# Patient Record
Sex: MALE | Race: WHITE | NOT HISPANIC OR LATINO | Employment: UNEMPLOYED | URBAN - NONMETROPOLITAN AREA
[De-identification: names, ages, dates, MRNs, and addresses within clinical notes are randomized per-mention and may not be internally consistent; named-entity substitution may affect disease eponyms.]

---

## 2018-10-15 ENCOUNTER — OFFICE VISIT (OUTPATIENT)
Dept: FAMILY MEDICINE CLINIC | Facility: CLINIC | Age: 28
End: 2018-10-15

## 2018-10-15 VITALS
HEART RATE: 70 BPM | BODY MASS INDEX: 18.05 KG/M2 | DIASTOLIC BLOOD PRESSURE: 81 MMHG | SYSTOLIC BLOOD PRESSURE: 121 MMHG | OXYGEN SATURATION: 99 % | HEIGHT: 78 IN | WEIGHT: 156 LBS

## 2018-10-15 DIAGNOSIS — R51.9 CHRONIC NONINTRACTABLE HEADACHE, UNSPECIFIED HEADACHE TYPE: ICD-10-CM

## 2018-10-15 DIAGNOSIS — L02.32 BOIL OF BUTTOCK: Primary | ICD-10-CM

## 2018-10-15 DIAGNOSIS — G89.29 CHRONIC NONINTRACTABLE HEADACHE, UNSPECIFIED HEADACHE TYPE: ICD-10-CM

## 2018-10-15 LAB
25(OH)D3 SERPL-MCNC: 20 NG/ML
ALBUMIN SERPL-MCNC: 4.9 G/DL (ref 3.5–5)
ALBUMIN/GLOB SERPL: 1.9 G/DL (ref 1.5–2.5)
ALP SERPL-CCNC: 54 U/L (ref 40–129)
ALT SERPL W P-5'-P-CCNC: 19 U/L (ref 10–44)
ANION GAP SERPL CALCULATED.3IONS-SCNC: 5 MMOL/L (ref 3.6–11.2)
AST SERPL-CCNC: 18 U/L (ref 10–34)
BASOPHILS # BLD AUTO: 0.08 10*3/MM3 (ref 0–0.3)
BASOPHILS NFR BLD AUTO: 0.9 % (ref 0–2)
BILIRUB SERPL-MCNC: 0.4 MG/DL (ref 0.2–1.8)
BUN BLD-MCNC: 11 MG/DL (ref 7–21)
BUN/CREAT SERPL: 11.8 (ref 7–25)
CALCIUM SPEC-SCNC: 9.7 MG/DL (ref 7.7–10)
CHLORIDE SERPL-SCNC: 104 MMOL/L (ref 99–112)
CO2 SERPL-SCNC: 31 MMOL/L (ref 24.3–31.9)
CREAT BLD-MCNC: 0.93 MG/DL (ref 0.43–1.29)
DEPRECATED RDW RBC AUTO: 41.2 FL (ref 37–54)
EOSINOPHIL # BLD AUTO: 0.39 10*3/MM3 (ref 0–0.7)
EOSINOPHIL NFR BLD AUTO: 4.6 % (ref 0–5)
ERYTHROCYTE [DISTWIDTH] IN BLOOD BY AUTOMATED COUNT: 12.3 % (ref 11.5–14.5)
ERYTHROCYTE [SEDIMENTATION RATE] IN BLOOD: 4 MM/HR (ref 0–15)
GFR SERPL CREATININE-BSD FRML MDRD: 97 ML/MIN/1.73
GLOBULIN UR ELPH-MCNC: 2.6 GM/DL
GLUCOSE BLD-MCNC: 85 MG/DL (ref 70–110)
HCT VFR BLD AUTO: 45.1 % (ref 42–52)
HGB BLD-MCNC: 15.5 G/DL (ref 14–18)
IMM GRANULOCYTES # BLD: 0.02 10*3/MM3 (ref 0–0.03)
IMM GRANULOCYTES NFR BLD: 0.2 % (ref 0–0.5)
LYMPHOCYTES # BLD AUTO: 2.18 10*3/MM3 (ref 1–3)
LYMPHOCYTES NFR BLD AUTO: 25.6 % (ref 21–51)
MCH RBC QN AUTO: 32.2 PG (ref 27–33)
MCHC RBC AUTO-ENTMCNC: 34.4 G/DL (ref 33–37)
MCV RBC AUTO: 93.6 FL (ref 80–94)
MONOCYTES # BLD AUTO: 0.82 10*3/MM3 (ref 0.1–0.9)
MONOCYTES NFR BLD AUTO: 9.6 % (ref 0–10)
NEUTROPHILS # BLD AUTO: 5.01 10*3/MM3 (ref 1.4–6.5)
NEUTROPHILS NFR BLD AUTO: 59.1 % (ref 30–70)
OSMOLALITY SERPL CALC.SUM OF ELEC: 278.1 MOSM/KG (ref 273–305)
PLATELET # BLD AUTO: 212 10*3/MM3 (ref 130–400)
PMV BLD AUTO: 11.2 FL (ref 6–10)
POTASSIUM BLD-SCNC: 4 MMOL/L (ref 3.5–5.3)
PROT SERPL-MCNC: 7.5 G/DL (ref 6–8)
RBC # BLD AUTO: 4.82 10*6/MM3 (ref 4.7–6.1)
SODIUM BLD-SCNC: 140 MMOL/L (ref 135–153)
TSH SERPL DL<=0.05 MIU/L-ACNC: 1.05 MIU/ML (ref 0.55–4.78)
VIT B12 BLD-MCNC: 638 PG/ML (ref 211–911)
WBC NRBC COR # BLD: 8.5 10*3/MM3 (ref 4.5–12.5)

## 2018-10-15 PROCEDURE — 87205 SMEAR GRAM STAIN: CPT | Performed by: FAMILY MEDICINE

## 2018-10-15 PROCEDURE — 36415 COLL VENOUS BLD VENIPUNCTURE: CPT | Performed by: FAMILY MEDICINE

## 2018-10-15 PROCEDURE — 84443 ASSAY THYROID STIM HORMONE: CPT | Performed by: FAMILY MEDICINE

## 2018-10-15 PROCEDURE — 80053 COMPREHEN METABOLIC PANEL: CPT | Performed by: FAMILY MEDICINE

## 2018-10-15 PROCEDURE — 82607 VITAMIN B-12: CPT | Performed by: FAMILY MEDICINE

## 2018-10-15 PROCEDURE — 82306 VITAMIN D 25 HYDROXY: CPT | Performed by: FAMILY MEDICINE

## 2018-10-15 PROCEDURE — 99204 OFFICE O/P NEW MOD 45 MIN: CPT | Performed by: FAMILY MEDICINE

## 2018-10-15 PROCEDURE — 85025 COMPLETE CBC W/AUTO DIFF WBC: CPT | Performed by: FAMILY MEDICINE

## 2018-10-15 PROCEDURE — 85652 RBC SED RATE AUTOMATED: CPT | Performed by: FAMILY MEDICINE

## 2018-10-15 PROCEDURE — 87070 CULTURE OTHR SPECIMN AEROBIC: CPT | Performed by: FAMILY MEDICINE

## 2018-10-15 RX ORDER — DOXYCYCLINE HYCLATE 100 MG
100 TABLET ORAL DAILY
Qty: 7 TABLET | Refills: 0 | Status: SHIPPED | OUTPATIENT
Start: 2018-10-15 | End: 2018-11-26

## 2018-10-15 NOTE — PATIENT INSTRUCTIONS
Doxycycline for butt boil.  We will call you with lab results, surgery consult, and scheduling of the MRI.

## 2018-10-17 ENCOUNTER — TELEPHONE (OUTPATIENT)
Dept: FAMILY MEDICINE CLINIC | Facility: CLINIC | Age: 28
End: 2018-10-17

## 2018-10-17 LAB
BACTERIA SPEC AEROBE CULT: NORMAL
GRAM STN SPEC: NORMAL
GRAM STN SPEC: NORMAL

## 2018-10-17 RX ORDER — ERGOCALCIFEROL 1.25 MG/1
50000 CAPSULE ORAL WEEKLY
Qty: 8 CAPSULE | Refills: 0 | Status: SHIPPED | OUTPATIENT
Start: 2018-10-17 | End: 2020-05-20

## 2018-10-17 NOTE — TELEPHONE ENCOUNTER
It really depends on the MRI. I can't say either way for now. If it looks to be a lengthy workup, we would be more than happy to fill it out, but if the MRI is negative and this is simple migraines, then [facility] won't be approved for that. Thanks.     Patient notified & verbalized understanding.

## 2018-10-17 NOTE — TELEPHONE ENCOUNTER
It really depends on the MRI. I can't say either way for now. If it looks to be a lengthy workup, we would be more than happy to fill it out, but if the MRI is negative and this is simple migraines, then [employer] won't be approved for that. Thanks.

## 2018-10-17 NOTE — TELEPHONE ENCOUNTER
----- Message from Kalyani Villagran MD sent at 10/17/2018  7:45 AM EDT -----  Please call Asif. No final read on the culture yet, but his Vitamin D is low. Will he take some to boost it up? It may help with fatigue and joint pain. Please send Vitamin D 95154 IU weekly, #8, no refills, if he is agreeable. Thanks.    Spoke with patient & he verbalized understanding,agreeable to Vit. D,sent to pharmacy as ordered.

## 2018-10-17 NOTE — TELEPHONE ENCOUNTER
Wife called wanting to know if you would fill out FMLA papers for him due to a head injury & other medical problems until someone figures out what is going on with him?

## 2018-10-24 ENCOUNTER — OFFICE VISIT (OUTPATIENT)
Dept: SURGERY | Facility: CLINIC | Age: 28
End: 2018-10-24

## 2018-10-24 ENCOUNTER — TELEPHONE (OUTPATIENT)
Dept: FAMILY MEDICINE CLINIC | Facility: CLINIC | Age: 28
End: 2018-10-24

## 2018-10-24 VITALS
DIASTOLIC BLOOD PRESSURE: 71 MMHG | SYSTOLIC BLOOD PRESSURE: 133 MMHG | WEIGHT: 158 LBS | HEART RATE: 85 BPM | BODY MASS INDEX: 18.28 KG/M2 | HEIGHT: 78 IN

## 2018-10-24 DIAGNOSIS — L02.31 CUTANEOUS ABSCESS OF BUTTOCK: Primary | ICD-10-CM

## 2018-10-24 PROCEDURE — 99406 BEHAV CHNG SMOKING 3-10 MIN: CPT | Performed by: SURGERY

## 2018-10-24 PROCEDURE — 99242 OFF/OP CONSLTJ NEW/EST SF 20: CPT | Performed by: SURGERY

## 2018-10-24 NOTE — PROGRESS NOTES
"Asif Medina     VITALS: Blood pressure 121/81, pulse 70, height 198.1 cm (78\"), weight 70.8 kg (156 lb), SpO2 99 %.    Subjective  Chief Complaint:   Chief Complaint   Patient presents with   • Abscess   • Headache        History of Present Illness:  Patient is a 28 y.o.  male who presents to clinic secondary to establishment of care.    Patient was seen today for these conditions and concerns:  1) Patient has a boil on his right buttock that consistently over the last few years has flared up here and there. He states that when it first happened several years ago, he had to go to surgery to get it drained. Over the last several years, he has managed to control it with wound hygiene. He states that this most recent episode started about three weeks ago. He has tried to alleviate his symptoms with pressure, but he states that this has not worked. He states that it has been draining yellow pus for approximately a week and the pain has been worsening. Denies any fevers or chills. He states that the area has increased in warmth.  2) Patient also states that for about the last month, he has been battling a chronic headache. He states that the headache is diffuse. He has tried various OTC medications including Tylenol and Motrin without any alleviation. This has not happened to him before. This is not the worst headache of his life. Headache is described as all-encompassing, dull, and throbbing. It occasionally affects his vision - he feels that he is having tunnel vision at times. He denies any numbness or tingling of his hands. He states that nothing really makes it better, even if he goes to sleep.     No complaints about any of the medications.    The following portions of the patient's history were reviewed and updated as appropriate: allergies, current medications, past family history, past medical history, past social history, past surgical history and problem list.    Past Medical History  History reviewed. No " pertinent past medical history.    Review of Systems  Constitutional: Denies any recent history of fevers, chills, itching.  Eyes: Denies any changes in vision. Denies any blurry vision or diplopia.  Ears, Nose, Mouth, Throat: Denies any sore throat, rhinorrhea, or cough.  Cardiovascular: Denies any chest pain, pressure, or palpitations.  Respiratory: Denies any shortness of breath or wheezing.  Gastrointestinal: Denies any abdominal pain, nausea, vomiting, diarrhea, or constipation.  Genitourinary: Denies any changes in urination.  Musculoskeletal: Denies any muscle weakness.  Skin and/or breasts: Denies any rashes.  Neurological: Denies any changes in balance or gait.  Psychiatric: Denies any anxiety, depression, or insomnia. Denies any suicidal or homicidal ideations.  Endocrine: Denies any heat or cold intolerance. Denies any voice changes, polydipsia, or polyuria.  Hematologic/Lymphatic: Denies any anemia or easy bruising.    Surgical History  Past Surgical History:   Procedure Laterality Date   • INCISION AND DRAINAGE OF WOUND         Family History  Family History   Problem Relation Age of Onset   • Hypertension Mother    • Kidney nephrosis Mother    • Cancer Father    • Anxiety disorder Father    • Bipolar disorder Father    • Depression Father    • Alcohol abuse Father    • Alcohol abuse Paternal Uncle    • Heart disease Maternal Grandmother    • Diabetes Maternal Grandmother    • Hypertension Maternal Grandmother    • Heart disease Maternal Grandfather    • Diabetes Maternal Grandfather    • Hypertension Maternal Grandfather    • Heart disease Paternal Grandmother    • Diabetes Paternal Grandmother    • Hypertension Paternal Grandmother    • Heart disease Paternal Grandfather    • Diabetes Paternal Grandfather    • Hypertension Paternal Grandfather    • Cancer Paternal Grandfather    • Bipolar disorder Paternal Grandfather        Social History  Social History     Social History   • Marital status:   "    Spouse name: N/A   • Number of children: N/A   • Years of education: N/A     Occupational History   • Not on file.     Social History Main Topics   • Smoking status: Current Every Day Smoker   • Smokeless tobacco: Never Used   • Alcohol use No   • Drug use: No   • Sexual activity: Defer     Other Topics Concern   • Not on file     Social History Narrative   • No narrative on file       Objective  Physical Exam  Gen: Patient in NAD. Pleasant and answers appropriately. A&Ox3.    Skin: Warm and dry with normal turgor. No purpura, rashes, or unusual pigmentation noted. Hair is normal in appearance and distribution.    HEENT: NC/AT. No lesions noted. Conjunctiva clear, sclera nonicteric. PERRL. EOMI without nystagmus or strabismus. Fundi appear benign. No hemorrhages or exudates of eyes. Auditory canals are patent bilaterally without lesions. TMs intact,  nonerythematous, nonbulging without lesions. Nasal mucosa pink, nonerythematous, and nonedematous. Frontal and maxillary sinuses are nontender. O/P nonerythematous and moist without exudate.    Neck: Supple without lymph nodes palpated. FROM.     Lungs: CTA B/L without rales, rhonchi, crackles, or wheezes.    Heart: RRR. S1 and S2 normal. No S3 or S4. No MRGT.    Abd: Soft, nontender,nondistended. (+)BSx4 quadrants.     Extrem: No CCE. Radial pulses 2+/4 and equal B/L. FROMx4. No bone, joint, or muscle tenderness noted.    Buttock: 1/2\" open lesion noted over the right buttock that is draining yellow pus, slightly erythematous, nonedematous without any increased warmth.    Neuro: No focal motor/sensory deficits.    Procedures    Assessment/Plan  Asif Medina is a 28 y.o. here for establishment of care.  Diagnoses and all orders for this visit:    Boil of buttock  -     Culture, Routine - Swab, Buttock, Right  -     Ambulatory Referral to General Surgery  -     doxycycline (VIBRAMYICN) 100 MG tablet; Take 1 tablet by mouth Daily.  Secondary to history, will send to " surgery. Culture taken. Will start patient on doxycycline 100 mg orally daily x 7 days.     Chronic nonintractable headache, unspecified headache type  -     CBC & Differential  -     Comprehensive Metabolic Panel  -     Vitamin B12  -     Vitamin D 25 Hydroxy  -     TSH  -     Sedimentation Rate  -     CBC Auto Differential  -     MRI Brain Without Contrast  -     Osmolality, Calculated; Future  -     Osmolality, Calculated  Most likely migraines. Will order labs to exclude other diagnoses. MRI Brain secondary to history, consistency, and repeat occurrences.     I spent 45 minutes face-to-face with the patient and family, of which 45 minutes were spent counseling regarding evaluation, diagnosis, prognosis, diagnostic testing, referrals, treatment options, implications of compliance, coordination of care, long-term management fo concurrent comorbidities, and risk and benefits of different interventions and alternative therapies.    Patient's Body mass index is 18.03 kg/m². BMI is within normal parameters. No follow-up required.     I advised Asif of the risks of continuing to use tobacco, and I provided him with tobacco cessation educational materials in the After Visit Summary.     During this visit, I spent 3 minutes counseling the patient regarding tobacco cessation.     Findings and plans discussed with patient who verbalizes understanding and agreement. Will followup with patient once results are in. Patient to followup at clinic PRN or in one month for further medical followup.    Kalyani Villagran MD

## 2018-10-24 NOTE — TELEPHONE ENCOUNTER
----- Message from Kalyani Villagran MD sent at 10/21/2018  6:28 PM EDT -----  Let him know that the culture grew out skin pieter. Finish the doxycycline and follow up with surgery when we get him an appointment. Thanks.      Left a message to return call.      Patient returned call & verbalized understanding.

## 2018-10-26 ENCOUNTER — TELEPHONE (OUTPATIENT)
Dept: FAMILY MEDICINE CLINIC | Facility: CLINIC | Age: 28
End: 2018-10-26

## 2018-10-26 PROBLEM — L02.31 CUTANEOUS ABSCESS OF BUTTOCK: Status: ACTIVE | Noted: 2018-10-26

## 2018-10-26 NOTE — TELEPHONE ENCOUNTER
Wife called reports patient's employer has requested he have FMLA papers filled out because he has had to miss work because his Headaches have been so bad that he could not drive to work & if he loses his job he would lose his insurance,she wants to know if you are willing to fill these out.    Received fax,papers are on your desk.

## 2018-10-26 NOTE — PROGRESS NOTES
Subjective   Asif Medina is a 28 y.o. male is being seen for consultation today at the request of Kalyani Villagran MD    28 y.o. male presenting for evaluation of skin lesion. Lesion on Right buttock with patient's observations stated as pain, Chronic draining wound that intermittently becomes inflamed, exam of this area shows skin abscess, features Area of chronic abscess with no active infection or wound at this time.  Small scab in the area is noted, size 4 mm.    History reviewed. No pertinent past medical history.    Family History   Problem Relation Age of Onset   • Hypertension Mother    • Kidney nephrosis Mother    • Cancer Father    • Anxiety disorder Father    • Bipolar disorder Father    • Depression Father    • Alcohol abuse Father    • Alcohol abuse Paternal Uncle    • Heart disease Maternal Grandmother    • Diabetes Maternal Grandmother    • Hypertension Maternal Grandmother    • Heart disease Maternal Grandfather    • Diabetes Maternal Grandfather    • Hypertension Maternal Grandfather    • Heart disease Paternal Grandmother    • Diabetes Paternal Grandmother    • Hypertension Paternal Grandmother    • Heart disease Paternal Grandfather    • Diabetes Paternal Grandfather    • Hypertension Paternal Grandfather    • Cancer Paternal Grandfather    • Bipolar disorder Paternal Grandfather        Social History     Social History   • Marital status:      Spouse name: N/A   • Number of children: N/A   • Years of education: N/A     Occupational History   • Not on file.     Social History Main Topics   • Smoking status: Current Every Day Smoker   • Smokeless tobacco: Never Used   • Alcohol use No   • Drug use: No   • Sexual activity: Defer     Other Topics Concern   • Not on file     Social History Narrative   • No narrative on file       Past Surgical History:   Procedure Laterality Date   • INCISION AND DRAINAGE OF WOUND         Review of Systems   Constitutional: Negative for activity change,  "appetite change, chills and fever.   HENT: Negative for sore throat and trouble swallowing.    Eyes: Negative for visual disturbance.   Respiratory: Negative for cough and shortness of breath.    Cardiovascular: Negative for chest pain and palpitations.   Gastrointestinal: Negative for abdominal distention, abdominal pain, blood in stool, constipation, diarrhea, nausea and vomiting.   Endocrine: Negative for cold intolerance and heat intolerance.   Genitourinary: Negative for dysuria.   Musculoskeletal: Negative for joint swelling.   Skin: Positive for wound. Negative for color change and rash.   Allergic/Immunologic: Negative for immunocompromised state.   Neurological: Negative for dizziness, seizures, weakness and headaches.   Hematological: Negative for adenopathy. Does not bruise/bleed easily.   Psychiatric/Behavioral: Negative for agitation and confusion.         /71   Pulse 85   Ht 198.1 cm (78\")   Wt 71.7 kg (158 lb)   BMI 18.26 kg/m²   Objective   Physical Exam   Constitutional: He is oriented to person, place, and time. He appears well-developed.   HENT:   Head: Normocephalic and atraumatic.   Mouth/Throat: Mucous membranes are normal.   Eyes: Pupils are equal, round, and reactive to light. Conjunctivae are normal.   Neck: Neck supple. No JVD present. No tracheal deviation present. No thyromegaly present.   Cardiovascular: Normal rate and regular rhythm.  Exam reveals no gallop and no friction rub.    No murmur heard.  Pulmonary/Chest: Effort normal and breath sounds normal.   Abdominal: Soft. He exhibits no distension. There is no splenomegaly or hepatomegaly. There is no tenderness. No hernia.   Musculoskeletal: Normal range of motion. He exhibits no deformity.   Neurological: He is alert and oriented to person, place, and time.   Skin: Skin is warm and dry.   Right buttock with a small scab at the site of previous abscesses.  No active infection or induration at this time   Psychiatric: He has " a normal mood and affect.         Problems Addressed this Visit        Musculoskeletal and Integument    Cutaneous abscess of buttock - Primary          Assessment     Asif Medina is a 28 y.o. male with chronic abscess of the right buttock.  Due to the location I do not suspect fistula but if it becomes actively inflamed begin he'll return to care for evaluation  Patient's Body mass index is 18.26 kg/m². BMI is within normal parameters. No follow-up required.    I advised Asif of the risks of continuing to use tobacco, and I provided him with tobacco cessation educational materials in the After Visit Summary.     During this visit, I spent 3 minutes counseling the patient regarding tobacco cessation.

## 2018-11-02 ENCOUNTER — HOSPITAL ENCOUNTER (OUTPATIENT)
Dept: MRI IMAGING | Facility: HOSPITAL | Age: 28
Discharge: HOME | End: 2018-11-02
Admitting: FAMILY MEDICINE

## 2018-11-02 PROCEDURE — 70551 MRI BRAIN STEM W/O DYE: CPT | Performed by: RADIOLOGY

## 2018-11-02 PROCEDURE — 70551 MRI BRAIN STEM W/O DYE: CPT

## 2018-11-08 ENCOUNTER — OFFICE VISIT (OUTPATIENT)
Dept: FAMILY MEDICINE CLINIC | Facility: CLINIC | Age: 28
End: 2018-11-08

## 2018-11-08 VITALS
BODY MASS INDEX: 18.4 KG/M2 | OXYGEN SATURATION: 99 % | DIASTOLIC BLOOD PRESSURE: 78 MMHG | HEIGHT: 78 IN | WEIGHT: 159 LBS | SYSTOLIC BLOOD PRESSURE: 119 MMHG | TEMPERATURE: 98.2 F | HEART RATE: 87 BPM

## 2018-11-08 DIAGNOSIS — G43.709 CHRONIC MIGRAINE WITHOUT AURA WITHOUT STATUS MIGRAINOSUS, NOT INTRACTABLE: Primary | ICD-10-CM

## 2018-11-08 PROCEDURE — 99213 OFFICE O/P EST LOW 20 MIN: CPT | Performed by: FAMILY MEDICINE

## 2018-11-08 RX ORDER — SUMATRIPTAN 20 MG/1
SPRAY NASAL
Qty: 1 EACH | Refills: 1 | Status: SHIPPED | OUTPATIENT
Start: 2018-11-08 | End: 2018-12-07 | Stop reason: SDUPTHER

## 2018-11-27 NOTE — PROGRESS NOTES
"Asif Medina     VITALS: Blood pressure 119/78, pulse 87, temperature 98.2 °F (36.8 °C), temperature source Oral, height 198.1 cm (77.99\"), weight 72.1 kg (159 lb), SpO2 99 %.    Subjective  Chief Complaint:   Chief Complaint   Patient presents with   • Follow-up   • Headache     MRI        History of Present Illness:  Patient is a 28 y.o.  male who presents to clinic secondary to medical followup.     Patient was seen today for these conditions and concerns:  1) Boil on his right buttock has improved.     2) Patient also states that for about the last month, he has been battling a chronic headache. He states that the headache is diffuse. He has tried various OTC medications including Tylenol and Motrin without any alleviation. This has not happened to him before. This is not the worst headache of his life. Headache is described as all-encompassing, dull, and throbbing. It occasionally affects his vision - he feels that he is having tunnel vision at times. He denies any numbness or tingling of his hands. He states that nothing really makes it better, even if he goes to sleep. Workup has been benign.    No complaints about any of the medications.    The following portions of the patient's history were reviewed and updated as appropriate: allergies, current medications, past family history, past medical history, past social history, past surgical history and problem list.    Past Medical History  History reviewed. No pertinent past medical history.    Review of Systems  Constitutional: Denies any recent history of dizziness, fevers, chills, itching.  Eyes: Denies any changes in vision. Denies any blurry vision or diplopia.  Ears, Nose, Mouth, Throat: Denies any sore throat, rhinorrhea, or cough.  Cardiovascular: Denies any chest pain, pressure, or palpitations.  Respiratory: Denies any shortness of breath or wheezing.  Gastrointestinal: Denies any abdominal pain, nausea, vomiting, diarrhea, or " constipation.  Genitourinary: Denies any changes in urination.  Musculoskeletal: Denies any muscle weakness.  Skin and/or breasts: Denies any rashes.  Neurological: Denies any changes in balance or gait.  Psychiatric: Denies any anxiety, depression, or insomnia. Denies any suicidal or homicidal ideations.  Endocrine: Denies any heat or cold intolerance. Denies any voice changes, polydipsia, or polyuria.  Hematologic/Lymphatic: Denies any anemia or easy bruising.    Surgical History  Past Surgical History:   Procedure Laterality Date   • INCISION AND DRAINAGE OF WOUND     • WISDOM TOOTH EXTRACTION  10/30/2018       Family History  Family History   Problem Relation Age of Onset   • Hypertension Mother    • Kidney nephrosis Mother    • Cancer Father    • Anxiety disorder Father    • Bipolar disorder Father    • Depression Father    • Alcohol abuse Father    • Alcohol abuse Paternal Uncle    • Heart disease Maternal Grandmother    • Diabetes Maternal Grandmother    • Hypertension Maternal Grandmother    • Heart disease Maternal Grandfather    • Diabetes Maternal Grandfather    • Hypertension Maternal Grandfather    • Heart disease Paternal Grandmother    • Diabetes Paternal Grandmother    • Hypertension Paternal Grandmother    • Heart disease Paternal Grandfather    • Diabetes Paternal Grandfather    • Hypertension Paternal Grandfather    • Cancer Paternal Grandfather    • Bipolar disorder Paternal Grandfather        Social History  Social History     Socioeconomic History   • Marital status:      Spouse name: Not on file   • Number of children: Not on file   • Years of education: Not on file   • Highest education level: Not on file   Social Needs   • Financial resource strain: Not on file   • Food insecurity - worry: Not on file   • Food insecurity - inability: Not on file   • Transportation needs - medical: Not on file   • Transportation needs - non-medical: Not on file   Occupational History   • Not on file    Tobacco Use   • Smoking status: Current Every Day Smoker   • Smokeless tobacco: Never Used   Substance and Sexual Activity   • Alcohol use: No   • Drug use: No   • Sexual activity: Defer   Other Topics Concern   • Not on file   Social History Narrative   • Not on file       Objective  Physical Exam  Gen: Patient in NAD. Pleasant and answers appropriately. A&Ox3.    Skin: Warm and dry with normal turgor. No purpura, rashes, or unusual pigmentation noted. Hair is normal in appearance and distribution.    HEENT: NC/AT. No lesions noted. Conjunctiva clear, sclera nonicteric. PERRL. EOMI without nystagmus or strabismus. Fundi appear benign. No hemorrhages or exudates of eyes. Auditory canals are patent bilaterally without lesions. TMs intact,  nonerythematous, nonbulging without lesions. Nasal mucosa pink, nonerythematous, and nonedematous. Frontal and maxillary sinuses are nontender. O/P nonerythematous and moist without exudate.    Neck: Supple without lymph nodes palpated. FROM.     Lungs: CTA B/L without rales, rhonchi, crackles, or wheezes.    Heart: RRR. S1 and S2 normal. No S3 or S4. No MRGT.    Abd: Soft, nontender,nondistended. (+)BSx4 quadrants.     Extrem: No CCE. Radial pulses 2+/4 and equal B/L. FROMx4. No bone, joint, or muscle tenderness noted.    Neuro: No focal motor/sensory deficits.    Procedures    Assessment/Plan  Asif Medina is a 28 y.o. here for medical followup.  Diagnoses and all orders for this visit:    Chronic migraine without aura without status migrainosus, not intractable  -     SUMAtriptan (IMITREX) 20 MG/ACT nasal spray; 1 spray into the nostril(s) opposite of headache side Every 2 (Two) Hours As Needed for Migraine. Limit of two sprays a day.      Patient's Body mass index is 18.38 kg/m². BMI is below normal parameters. Recommendations include: treating the underlying disease process.       I advised Asif of the risks of continuing to use tobacco, and I provided him with tobacco  cessation educational materials in the After Visit Summary.     During this visit, I spent 3 minutes counseling the patient regarding tobacco cessation.     Findings and plans discussed with patient who verbalizes understanding and agreement. Will followup with patient once results are in. Patient to followup at clinic PRN or in one month for further medical followup.    Kalyani Villagran MD

## 2018-12-07 ENCOUNTER — TELEPHONE (OUTPATIENT)
Dept: FAMILY MEDICINE CLINIC | Facility: CLINIC | Age: 28
End: 2018-12-07

## 2018-12-07 ENCOUNTER — OFFICE VISIT (OUTPATIENT)
Dept: FAMILY MEDICINE CLINIC | Facility: CLINIC | Age: 28
End: 2018-12-07

## 2018-12-07 VITALS
DIASTOLIC BLOOD PRESSURE: 86 MMHG | WEIGHT: 169 LBS | BODY MASS INDEX: 19.55 KG/M2 | TEMPERATURE: 98.7 F | SYSTOLIC BLOOD PRESSURE: 132 MMHG | HEIGHT: 78 IN | HEART RATE: 70 BPM | OXYGEN SATURATION: 99 %

## 2018-12-07 DIAGNOSIS — G43.709 CHRONIC MIGRAINE WITHOUT AURA WITHOUT STATUS MIGRAINOSUS, NOT INTRACTABLE: Primary | ICD-10-CM

## 2018-12-07 DIAGNOSIS — E55.9 VITAMIN D DEFICIENCY: ICD-10-CM

## 2018-12-07 DIAGNOSIS — Z23 IMMUNIZATION DUE: ICD-10-CM

## 2018-12-07 PROCEDURE — 90471 IMMUNIZATION ADMIN: CPT | Performed by: FAMILY MEDICINE

## 2018-12-07 PROCEDURE — 99214 OFFICE O/P EST MOD 30 MIN: CPT | Performed by: FAMILY MEDICINE

## 2018-12-07 PROCEDURE — 90632 HEPA VACCINE ADULT IM: CPT | Performed by: FAMILY MEDICINE

## 2018-12-07 PROCEDURE — 99406 BEHAV CHNG SMOKING 3-10 MIN: CPT | Performed by: FAMILY MEDICINE

## 2018-12-07 RX ORDER — TOPIRAMATE 100 MG/1
50 TABLET, FILM COATED ORAL DAILY
Qty: 15 TABLET | Refills: 5 | Status: SHIPPED | OUTPATIENT
Start: 2018-12-07 | End: 2020-02-28

## 2018-12-07 RX ORDER — SUMATRIPTAN 20 MG/1
SPRAY NASAL
Qty: 1 EACH | Refills: 5 | Status: SHIPPED | OUTPATIENT
Start: 2018-12-07 | End: 2020-02-28

## 2018-12-07 RX ORDER — TOPIRAMATE 50 MG/1
50 TABLET, FILM COATED ORAL DAILY
Qty: 30 TABLET | Refills: 5 | Status: SHIPPED | OUTPATIENT
Start: 2018-12-07 | End: 2018-12-07 | Stop reason: SDUPTHER

## 2018-12-07 NOTE — TELEPHONE ENCOUNTER
Topamax 50mg & 25mg are on back order,ok with Provider to give 100mg tabs to take 1/2 tab daily & patient has no problems with this.

## 2018-12-24 NOTE — PROGRESS NOTES
"Asif Medina     VITALS: Blood pressure 132/86, pulse 70, temperature 98.7 °F (37.1 °C), temperature source Oral, height 198.1 cm (77.99\"), weight 76.7 kg (169 lb), SpO2 99 %.    Subjective  Chief Complaint:   Chief Complaint   Patient presents with   • Follow-up   • Migraine        History of Present Illness:  Patient is a 28 y.o.  male who presents to clinic secondary to medical followup. No new or acute concerns.      Patient has chronic migraines. He states that the headache is diffuse. He has tried various OTC medications including Tylenol and Motrin without any alleviation. This has not happened to him before. This is not the worst headache of his life. Headache is described as all-encompassing, dull, and throbbing. It occasionally affects his vision - he feels that he is having tunnel vision at times. He denies any numbness or tingling of his hands. He states that nothing really makes it better, even if he goes to sleep. Workup has been benign. Started on sumatriptan. He states that this does help him, but the headaches have not recurred lately.     No complaints about any of the medications.    The following portions of the patient's history were reviewed and updated as appropriate: allergies, current medications, past family history, past medical history, past social history, past surgical history and problem list.    Past Medical History  History reviewed. No pertinent past medical history.    Review of Systems  Constitutional: Denies any recent history of HAs, dizziness, fevers, chills, itching.  Eyes: Denies any changes in vision. Denies any blurry vision or diplopia.  Ears, Nose, Mouth, Throat: Denies any sore throat, rhinorrhea, or cough.  Cardiovascular: Denies any chest pain, pressure, or palpitations.  Respiratory: Denies any shortness of breath or wheezing.  Gastrointestinal: Denies any abdominal pain, nausea, vomiting, diarrhea, or constipation.  Genitourinary: Denies any changes in " urination.  Musculoskeletal: Denies any muscle weakness.  Skin and/or breasts: Denies any rashes.  Neurological: Denies any changes in balance or gait.  Psychiatric: Denies any anxiety, depression, or insomnia. Denies any suicidal or homicidal ideations.  Endocrine: Denies any heat or cold intolerance. Denies any voice changes, polydipsia, or polyuria.  Hematologic/Lymphatic: Denies any anemia or easy bruising.    Surgical History  Past Surgical History:   Procedure Laterality Date   • INCISION AND DRAINAGE OF WOUND     • WISDOM TOOTH EXTRACTION  10/30/2018       Family History  Family History   Problem Relation Age of Onset   • Hypertension Mother    • Kidney nephrosis Mother    • Cancer Father    • Anxiety disorder Father    • Bipolar disorder Father    • Depression Father    • Alcohol abuse Father    • Alcohol abuse Paternal Uncle    • Heart disease Maternal Grandmother    • Diabetes Maternal Grandmother    • Hypertension Maternal Grandmother    • Heart disease Maternal Grandfather    • Diabetes Maternal Grandfather    • Hypertension Maternal Grandfather    • Heart disease Paternal Grandmother    • Diabetes Paternal Grandmother    • Hypertension Paternal Grandmother    • Heart disease Paternal Grandfather    • Diabetes Paternal Grandfather    • Hypertension Paternal Grandfather    • Cancer Paternal Grandfather    • Bipolar disorder Paternal Grandfather        Social History  Social History     Socioeconomic History   • Marital status:      Spouse name: Not on file   • Number of children: Not on file   • Years of education: Not on file   • Highest education level: Not on file   Social Needs   • Financial resource strain: Not on file   • Food insecurity - worry: Not on file   • Food insecurity - inability: Not on file   • Transportation needs - medical: Not on file   • Transportation needs - non-medical: Not on file   Occupational History   • Not on file   Tobacco Use   • Smoking status: Current Every Day  Smoker   • Smokeless tobacco: Never Used   Substance and Sexual Activity   • Alcohol use: No   • Drug use: No   • Sexual activity: Defer   Other Topics Concern   • Not on file   Social History Narrative   • Not on file       Objective  Physical Exam  Gen: Patient in NAD. Pleasant and answers appropriately. A&Ox3.    Skin: Warm and dry with normal turgor. No purpura, rashes, or unusual pigmentation noted. Hair is normal in appearance and distribution.    HEENT: NC/AT. No lesions noted. Conjunctiva clear, sclera nonicteric. PERRL. EOMI without nystagmus or strabismus. Fundi appear benign. No hemorrhages or exudates of eyes. Auditory canals are patent bilaterally without lesions. TMs intact,  nonerythematous, nonbulging without lesions. Nasal mucosa pink, nonerythematous, and nonedematous. Frontal and maxillary sinuses are nontender. O/P nonerythematous and moist without exudate.    Neck: Supple without lymph nodes palpated. FROM.     Lungs: CTA B/L without rales, rhonchi, crackles, or wheezes.    Heart: RRR. S1 and S2 normal. No S3 or S4. No MRGT.    Abd: Soft, nontender,nondistended. (+)BSx4 quadrants.     Extrem: No CCE. Radial pulses 2+/4 and equal B/L. FROMx4. No bone, joint, or muscle tenderness noted.    Neuro: No focal motor/sensory deficits.    Procedures    Assessment/Plan  Asif Medina is a 28 y.o. here for medical followup.  Diagnoses and all orders for this visit:    Chronic migraine without aura without status migrainosus, not intractable  -     SUMAtriptan (IMITREX) 20 MG/ACT nasal spray; 1 spray into the nostril(s) opposite of headache side Every 2 (Two) Hours As Needed for Migraine. Limit of two sprays a day.  -     Discontinue: topiramate (TOPAMAX) 50 MG tablet; Take 1 tablet by mouth Daily.    Vitamin D deficiency  On high dose Vitamin D.     Immunization due  -     Hepatitis A Vaccine Adult IM    Patient's Body mass index is 19.53 kg/m². BMI is within normal parameters. No follow-up required.        I advised Asif of the risks of continuing to use tobacco, and I provided him with tobacco cessation educational materials in the After Visit Summary.     During this visit, I spent 3 minutes counseling the patient regarding tobacco cessation.     Findings and plans discussed with patient who verbalizes understanding and agreement. Will followup with patient once results are in. Patient to followup at clinic PRN or in six months for further medical followup.    Kalyani Villagran MD

## 2019-02-06 NOTE — PATIENT INSTRUCTIONS
Try the imitrex nasal spray.   Decrease caffeine.   Dapsone Counseling: I discussed with the patient the risks of dapsone including but not limited to hemolytic anemia, agranulocytosis, rashes, methemoglobinemia, kidney failure, peripheral neuropathy, headaches, GI upset, and liver toxicity.  Patients who start dapsone require monitoring including baseline LFTs and weekly CBCs for the first month, then every month thereafter.  The patient verbalized understanding of the proper use and possible adverse effects of dapsone.  All of the patient's questions and concerns were addressed.

## 2020-02-28 ENCOUNTER — OFFICE VISIT (OUTPATIENT)
Dept: FAMILY MEDICINE CLINIC | Facility: CLINIC | Age: 30
End: 2020-02-28

## 2020-02-28 VITALS
WEIGHT: 156 LBS | HEIGHT: 78 IN | TEMPERATURE: 98.4 F | OXYGEN SATURATION: 98 % | BODY MASS INDEX: 18.05 KG/M2 | HEART RATE: 109 BPM | SYSTOLIC BLOOD PRESSURE: 110 MMHG | DIASTOLIC BLOOD PRESSURE: 80 MMHG

## 2020-02-28 DIAGNOSIS — L02.32 BOIL OF BUTTOCK: Primary | ICD-10-CM

## 2020-02-28 PROCEDURE — 85025 COMPLETE CBC W/AUTO DIFF WBC: CPT | Performed by: FAMILY MEDICINE

## 2020-02-28 PROCEDURE — 87070 CULTURE OTHR SPECIMN AEROBIC: CPT | Performed by: FAMILY MEDICINE

## 2020-02-28 PROCEDURE — 10080 I&D PILONIDAL CYST SIMPLE: CPT | Performed by: FAMILY MEDICINE

## 2020-02-28 PROCEDURE — 87205 SMEAR GRAM STAIN: CPT | Performed by: FAMILY MEDICINE

## 2020-02-28 PROCEDURE — 99214 OFFICE O/P EST MOD 30 MIN: CPT | Performed by: FAMILY MEDICINE

## 2020-02-28 PROCEDURE — 80053 COMPREHEN METABOLIC PANEL: CPT | Performed by: FAMILY MEDICINE

## 2020-02-28 RX ORDER — DOXYCYCLINE HYCLATE 100 MG/1
100 CAPSULE ORAL 2 TIMES DAILY
Qty: 20 CAPSULE | Refills: 0 | Status: SHIPPED | OUTPATIENT
Start: 2020-02-28 | End: 2020-05-20

## 2020-02-28 RX ORDER — HYDROCODONE BITARTRATE AND ACETAMINOPHEN 7.5; 325 MG/1; MG/1
1 TABLET ORAL EVERY 8 HOURS PRN
Qty: 9 TABLET | Refills: 0 | Status: SHIPPED | OUTPATIENT
Start: 2020-02-28 | End: 2020-05-20

## 2020-02-29 LAB
ALBUMIN SERPL-MCNC: 5.3 G/DL (ref 3.5–5.2)
ALBUMIN/GLOB SERPL: 2.3 G/DL
ALP SERPL-CCNC: 56 U/L (ref 39–117)
ALT SERPL W P-5'-P-CCNC: 11 U/L (ref 1–41)
ANION GAP SERPL CALCULATED.3IONS-SCNC: 14.1 MMOL/L (ref 5–15)
AST SERPL-CCNC: 14 U/L (ref 1–40)
BASOPHILS # BLD AUTO: 0.09 10*3/MM3 (ref 0–0.2)
BASOPHILS NFR BLD AUTO: 0.8 % (ref 0–1.5)
BILIRUB SERPL-MCNC: 0.4 MG/DL (ref 0.2–1.2)
BUN BLD-MCNC: 7 MG/DL (ref 6–20)
BUN/CREAT SERPL: 7.1 (ref 7–25)
CALCIUM SPEC-SCNC: 9.5 MG/DL (ref 8.6–10.5)
CHLORIDE SERPL-SCNC: 100 MMOL/L (ref 98–107)
CO2 SERPL-SCNC: 26.9 MMOL/L (ref 22–29)
CREAT BLD-MCNC: 0.98 MG/DL (ref 0.76–1.27)
DEPRECATED RDW RBC AUTO: 43.4 FL (ref 37–54)
EOSINOPHIL # BLD AUTO: 0.1 10*3/MM3 (ref 0–0.4)
EOSINOPHIL NFR BLD AUTO: 0.9 % (ref 0.3–6.2)
ERYTHROCYTE [DISTWIDTH] IN BLOOD BY AUTOMATED COUNT: 12.7 % (ref 12.3–15.4)
GFR SERPL CREATININE-BSD FRML MDRD: 90 ML/MIN/1.73
GLOBULIN UR ELPH-MCNC: 2.3 GM/DL
GLUCOSE BLD-MCNC: 106 MG/DL (ref 65–99)
HCT VFR BLD AUTO: 48.6 % (ref 37.5–51)
HGB BLD-MCNC: 16.9 G/DL (ref 13–17.7)
IMM GRANULOCYTES # BLD AUTO: 0.03 10*3/MM3 (ref 0–0.05)
IMM GRANULOCYTES NFR BLD AUTO: 0.3 % (ref 0–0.5)
LYMPHOCYTES # BLD AUTO: 2.11 10*3/MM3 (ref 0.7–3.1)
LYMPHOCYTES NFR BLD AUTO: 18.2 % (ref 19.6–45.3)
MCH RBC QN AUTO: 32.1 PG (ref 26.6–33)
MCHC RBC AUTO-ENTMCNC: 34.8 G/DL (ref 31.5–35.7)
MCV RBC AUTO: 92.2 FL (ref 79–97)
MONOCYTES # BLD AUTO: 0.92 10*3/MM3 (ref 0.1–0.9)
MONOCYTES NFR BLD AUTO: 7.9 % (ref 5–12)
NEUTROPHILS # BLD AUTO: 8.37 10*3/MM3 (ref 1.7–7)
NEUTROPHILS NFR BLD AUTO: 71.9 % (ref 42.7–76)
NRBC BLD AUTO-RTO: 0.1 /100 WBC (ref 0–0.2)
PLATELET # BLD AUTO: 232 10*3/MM3 (ref 140–450)
PMV BLD AUTO: 11.6 FL (ref 6–12)
POTASSIUM BLD-SCNC: 4.2 MMOL/L (ref 3.5–5.2)
PROT SERPL-MCNC: 7.6 G/DL (ref 6–8.5)
RBC # BLD AUTO: 5.27 10*6/MM3 (ref 4.14–5.8)
SODIUM BLD-SCNC: 141 MMOL/L (ref 136–145)
WBC NRBC COR # BLD: 11.62 10*3/MM3 (ref 3.4–10.8)

## 2020-03-02 ENCOUNTER — OFFICE VISIT (OUTPATIENT)
Dept: FAMILY MEDICINE CLINIC | Facility: CLINIC | Age: 30
End: 2020-03-02

## 2020-03-02 VITALS
BODY MASS INDEX: 18.16 KG/M2 | TEMPERATURE: 98.2 F | WEIGHT: 157 LBS | DIASTOLIC BLOOD PRESSURE: 70 MMHG | HEIGHT: 78 IN | HEART RATE: 80 BPM | SYSTOLIC BLOOD PRESSURE: 115 MMHG | OXYGEN SATURATION: 98 %

## 2020-03-02 DIAGNOSIS — F41.9 ANXIETY: ICD-10-CM

## 2020-03-02 DIAGNOSIS — L02.32 BOIL OF BUTTOCK: Primary | ICD-10-CM

## 2020-03-02 LAB
BACTERIA SPEC AEROBE CULT: NORMAL
GRAM STN SPEC: NORMAL
GRAM STN SPEC: NORMAL

## 2020-03-02 PROCEDURE — 99213 OFFICE O/P EST LOW 20 MIN: CPT | Performed by: FAMILY MEDICINE

## 2020-03-02 RX ORDER — BUSPIRONE HYDROCHLORIDE 5 MG/1
5 TABLET ORAL 3 TIMES DAILY
Qty: 90 TABLET | Refills: 1 | Status: SHIPPED | OUTPATIENT
Start: 2020-03-02 | End: 2020-05-20

## 2020-03-13 ENCOUNTER — TELEPHONE (OUTPATIENT)
Dept: FAMILY MEDICINE CLINIC | Facility: CLINIC | Age: 30
End: 2020-03-13

## 2020-03-13 DIAGNOSIS — L02.32 BOIL OF BUTTOCK: Primary | ICD-10-CM

## 2020-03-13 NOTE — TELEPHONE ENCOUNTER
He saw Dr. Shah last time. Dr. Shah is general surgery. Does he want to go back or does he want to see colorectal? To see colorectal, we would have to send to [place]. We had discussed previously that he may have to go to colorectal sooner or later. Thanks.      Prefers to see Dr. Shah again.

## 2020-03-13 NOTE — TELEPHONE ENCOUNTER
He saw Dr. Shah last time. Dr. Shah is general surgery. Does he want to go back or does he want to see colorectal? To see colorectal, we would have to send to [place]. We had discussed previously that he may have to go to colorectal sooner or later. Thanks.

## 2020-03-13 NOTE — TELEPHONE ENCOUNTER
Wife called reports the area you lanced has filled back up & they are requesting a referral to general surgery,no preference.

## 2020-03-16 NOTE — PROGRESS NOTES
"Asif Medina     VITALS: Blood pressure 110/80, pulse 109, temperature 98.4 °F (36.9 °C), temperature source Oral, height 198.1 cm (78\"), weight 70.8 kg (156 lb), SpO2 98 %.    Subjective  Chief Complaint:   Chief Complaint   Patient presents with   • Abscess        History of Present Illness:  Patient is a 29 y.o.  male with a medical history significant for migraines and anxiety who presents to clinic secondary to an acute concern.  He has a 2-day history of a boil on the right side of his buttock.  He states that he has had them before previously.  Dr. Shah has drained them previously.  He denies any fevers or chills.  He states that it hurts to sit.  He states that the area is swollen, red, and has increased warmth.  He denies any discharge to it.  He states that he has tried hot compresses but without any alleviation in the pain or edema.    No complaints about any of the medications.    The following portions of the patient's history were reviewed and updated as appropriate: allergies, current medications, past family history, past medical history, past social history, past surgical history and problem list.    Past Medical History  History reviewed. No pertinent past medical history.    Review of Systems   Respiratory: Negative for shortness of breath and wheezing.    Cardiovascular: Negative for chest pain and palpitations.       Surgical History  Past Surgical History:   Procedure Laterality Date   • INCISION AND DRAINAGE OF WOUND     • WISDOM TOOTH EXTRACTION  10/30/2018       Family History  Family History   Problem Relation Age of Onset   • Hypertension Mother    • Kidney nephrosis Mother    • Cancer Father    • Anxiety disorder Father    • Bipolar disorder Father    • Depression Father    • Alcohol abuse Father    • Alcohol abuse Paternal Uncle    • Heart disease Maternal Grandmother    • Diabetes Maternal Grandmother    • Hypertension Maternal Grandmother    • Heart disease Maternal Grandfather    • " Diabetes Maternal Grandfather    • Hypertension Maternal Grandfather    • Heart disease Paternal Grandmother    • Diabetes Paternal Grandmother    • Hypertension Paternal Grandmother    • Heart disease Paternal Grandfather    • Diabetes Paternal Grandfather    • Hypertension Paternal Grandfather    • Cancer Paternal Grandfather    • Bipolar disorder Paternal Grandfather        Social History  Social History     Socioeconomic History   • Marital status:      Spouse name: Not on file   • Number of children: Not on file   • Years of education: Not on file   • Highest education level: Not on file   Tobacco Use   • Smoking status: Current Every Day Smoker   • Smokeless tobacco: Never Used   Substance and Sexual Activity   • Alcohol use: No   • Drug use: No   • Sexual activity: Defer       Objective  Physical Exam    Gen: Patient in NAD. Pleasant and answers appropriately. A&Ox3.    Skin: Warm and dry with normal turgor. No purpura, rashes, or unusual pigmentation noted. Hair is normal in appearance and distribution.  Fluctuant boil over right buttock close to the medial area that is erythematous, edematous, with very increased tenderness to the area.  Some increased warmth.  No discharge noted.    HEENT: NC/AT. No lesions noted. Conjunctiva clear, sclera nonicteric. PERRL. EOMI without nystagmus or strabismus. Fundi appear benign. No hemorrhages or exudates of eyes. Auditory canals are patent bilaterally without lesions. TMs intact,  nonerythematous, nonbulging without lesions. Nasal mucosa pink, nonerythematous, and nonedematous. Frontal and maxillary sinuses are nontender. O/P nonerythematous and moist without exudate.    Neck: Supple without lymph nodes palpated. FROM.     Lungs: CTA B/L without rales, rhonchi, crackles, or wheezes.    Heart: RRR. S1 and S2 normal. No S3 or S4. No MRGT.    Abd: Soft, nontender,nondistended. (+)BSx4 quadrants.     Extrem: No CCE. Radial pulses 2+/4 and equal B/L. FROMx4. No  "bone, joint, or muscle tenderness noted.    Neuro: No focal motor/sensory deficits.    Incision & Drainage  Date/Time: 2/28/2020 10:45 AM  Performed by: Kalyani Villagran MD  Authorized by: Kalyani Villagran MD   Consent: Verbal consent obtained. Written consent obtained.  Risks and benefits: risks, benefits and alternatives were discussed  Consent given by: patient  Patient understanding: patient states understanding of the procedure being performed  Patient consent: the patient's understanding of the procedure matches consent given  Procedure consent: procedure consent matches procedure scheduled  Relevant documents: relevant documents present and verified  Site marked: the operative site was marked  Patient identity confirmed: verbally with patient  Time out: Immediately prior to procedure a \"time out\" was called to verify the correct patient, procedure, equipment, support staff and site/side marked as required.  Type: pilonidal cyst  Body area: anogenital  Location details: pilonidal  Anesthesia: local infiltration    Anesthesia:  Local Anesthetic: lidocaine 1% with epinephrine  Anesthetic total: 5 mL    Sedation:  Patient sedated: no    Scalpel size: 11  Needle gauge: 22  Incision type: single straight  Drainage: bloody  Drainage amount: copious  Wound treatment: wound left open  Packing material: 1/2 in iodoform gauze  Patient tolerance: Patient tolerated the procedure well with no immediate complications  Comments: PROCEDURE: Incision and drainage of abscess     PROCEDURE:   A timeout protocol was performed prior to initiating the procedure.  The area was prepared and draped in the usual, sterile manner. The site was anesthetized with 5 ml 1% lidocaine with epinephrine. A linear incision along the local skin lines was made and the bloody material expressed. The abcess was explored thoroughly and sequestered pockets were opened. Bleeding was approximately 10 ml.   Packing: Iodoform gauze.      Followup: The " patient tolerated the procedure well without complications.  Standard post-procedure care is explained and return precautions are given.  Wound hygiene discussed.          Assessment/Plan  Asif Medina is a 29 y.o. here for an acute concern.  Diagnoses and all orders for this visit:    Boil of buttock  -     CBC & Differential; Future  -     Comprehensive Metabolic Panel; Future  -     Culture, Routine - Swab, Buttock, Right; Future  -     doxycycline (VIBRAMYCIN) 100 MG capsule; Take 1 capsule by mouth 2 (Two) Times a Day.  -     HYDROcodone-acetaminophen (NORCO) 7.5-325 MG per tablet; Take 1 tablet by mouth Every 8 (Eight) Hours As Needed for Moderate Pain .  -     CBC & Differential  -     Comprehensive Metabolic Panel  -     Cancel: Culture, Routine - Swab, Buttock, Right  -     CBC Auto Differential  -     Wound Culture - Wound, Buttock, Right; Future  -     Wound Culture - Wound, Buttock, Right    Fer #[id number] reviewed and is consistent.  UDS  reviewed and is consistent.  Patient comfort assessment guide reviewed and updated today.    As part of the patient's treatment plan they are being prescribed a controlled substance/ substances with potential for abuse.  This patient has been made aware of the appropriate use of such medications, including potential risk of somnolence, limited ability to drive and/or work safely, and potential for overdose.  It has also been made clear these medications are for use by the patient only, without concomitant use of alcohol or other substances unless prescribed/advised by medical provider.    Patient has completed prescribing agreement detailing terms of continued prescribing of controlled substances including monitoring FER reports, urine drug screens, and pill counts.  The patient is aware FER reports are reviewed on a regular basis and scanned into the chart.    History and physical exam exhibit continued safe and appropriate use of controlled  substances.        Patient's Body mass index is 18.03 kg/m². BMI is within normal parameters. No follow-up required..     Findings and plans discussed with patient who verbalizes understanding and agreement. Will followup with patient once results are in. Patient to followup at clinic PRN or in three days for further medical followup.    MD MARIO Gusman Dragon/Transcription Disclaimer:  Much of this encounter note is an electronic transcription/translation of spoken language to printed text.  The electronic translation of spoken language may permit erroneous, or at times, nonsensical words or phrases to be inadvertently transcribed.  Although I have reviewed the note for such errors, some may still exist.

## 2020-03-23 NOTE — PROGRESS NOTES
"Asif Medina     VITALS: Blood pressure 115/70, pulse 80, temperature 98.2 °F (36.8 °C), temperature source Oral, height 198.1 cm (77.99\"), weight 71.2 kg (157 lb), SpO2 98 %.    Subjective  Chief Complaint:   Chief Complaint   Patient presents with   • Follow-up   • Abscess        History of Present Illness:  Patient is a 29 y.o.  male with a benign medical history who presents to clinic secondary to medical followup.  He is here for 2 reasons.  3 days ago, patient had an incision and drainage done on a pilondial cyst over the right side of his buttock.  He is here for follow-up.  He states that it feels great.  He denies any new drainage.  He has been repacking it and he states that it has healed.  He denies any erythema, edema, or increased warmth.  It is not hurting anymore.  He denies any fevers or chills.    Patient is also here because of increased anxiety.  He states that he would like something for it.  He has never taken anything prior for this.  He states that he is not sleeping as well.  He is able to get up and do daily activities.  He cannot make any goals.  Some anhedonia.  No guilt.  No crying.  Decreased memory and concentration ability.  Appetite is the same.  He denies any auditory or visual hallucinations.  He denies any suicidal or homicidal ideations.    No complaints about any of the medications.    The following portions of the patient's history were reviewed and updated as appropriate: allergies, current medications, past family history, past medical history, past social history, past surgical history and problem list.    Past Medical History  History reviewed. No pertinent past medical history.    Review of Systems   Respiratory: Negative for shortness of breath and wheezing.    Cardiovascular: Negative for chest pain and palpitations.       Surgical History  Past Surgical History:   Procedure Laterality Date   • INCISION AND DRAINAGE OF WOUND     • WISDOM TOOTH EXTRACTION  10/30/2018 "       Family History  Family History   Problem Relation Age of Onset   • Hypertension Mother    • Kidney nephrosis Mother    • Cancer Father    • Anxiety disorder Father    • Bipolar disorder Father    • Depression Father    • Alcohol abuse Father    • Alcohol abuse Paternal Uncle    • Heart disease Maternal Grandmother    • Diabetes Maternal Grandmother    • Hypertension Maternal Grandmother    • Heart disease Maternal Grandfather    • Diabetes Maternal Grandfather    • Hypertension Maternal Grandfather    • Heart disease Paternal Grandmother    • Diabetes Paternal Grandmother    • Hypertension Paternal Grandmother    • Heart disease Paternal Grandfather    • Diabetes Paternal Grandfather    • Hypertension Paternal Grandfather    • Cancer Paternal Grandfather    • Bipolar disorder Paternal Grandfather        Social History  Social History     Socioeconomic History   • Marital status:      Spouse name: Not on file   • Number of children: Not on file   • Years of education: Not on file   • Highest education level: Not on file   Tobacco Use   • Smoking status: Current Every Day Smoker   • Smokeless tobacco: Never Used   Substance and Sexual Activity   • Alcohol use: No   • Drug use: No   • Sexual activity: Defer       Objective  Physical Exam    Gen: Patient in NAD. Pleasant and answers appropriately. A&Ox3.    Skin: Warm and dry with normal turgor. No purpura, rashes, or unusual pigmentation noted. Hair is normal in appearance and distribution.    HEENT: NC/AT. No lesions noted. Conjunctiva clear, sclera nonicteric. PERRL. EOMI without nystagmus or strabismus. Fundi appear benign. No hemorrhages or exudates of eyes. Auditory canals are patent bilaterally without lesions. TMs intact,  nonerythematous, nonbulging without lesions. Nasal mucosa pink, nonerythematous, and nonedematous. Frontal and maxillary sinuses are nontender. O/P nonerythematous and moist without exudate.    Neck: Supple without lymph nodes  palpated. FROM.     Lungs: CTA B/L without rales, rhonchi, crackles, or wheezes.    Heart: RRR. S1 and S2 normal. No S3 or S4. No MRGT.    Abd: Soft, nontender,nondistended. (+)BSx4 quadrants.     Extrem: No CCE. Radial pulses 2+/4 and equal B/L. FROMx4. No bone, joint, or muscle tenderness noted.    Buttocks: Incision over right buttock is healing quite nicely without any erythema, edema, increased warmth, or discharge.    Neuro: No focal motor/sensory deficits.    Procedures    Assessment/Plan  Asif Medina is a 29 y.o. here for medical followup.  Diagnoses and all orders for this visit:    Boil of buttock  Continue to monitor.  May still need colorectal secondary to secondary incidence.    Anxiety  -     TSH; Future  -     busPIRone (BUSPAR) 5 MG tablet; Take 1 tablet by mouth 3 (Three) Times a Day.  Patient to try BuSpar 5 mg orally 3 times a day.    Patient's Body mass index is 18.15 kg/m². BMI is within normal parameters. No follow-up required..       Asif Medina  reports that he has been smoking. He has never used smokeless tobacco.. I have educated him on the risk of diseases from using tobacco products such as cancer, COPD and heart diease.     I advised him to quit and he is not willing to quit.    I spent 3  minutes counseling the patient.          Findings and plans discussed with patient who verbalizes understanding and agreement. Will followup with patient once results are in. Patient to followup at clinic PRN or in three months for further medical followup.    Kalyani Villagran MD    EMR Dragon/Transcription Disclaimer:  Much of this encounter note is an electronic transcription/translation of spoken language to printed text.  The electronic translation of spoken language may permit erroneous, or at times, nonsensical words or phrases to be inadvertently transcribed.  Although I have reviewed the note for such errors, some may still exist.

## 2020-05-20 ENCOUNTER — OFFICE VISIT (OUTPATIENT)
Dept: FAMILY MEDICINE CLINIC | Facility: CLINIC | Age: 30
End: 2020-05-20

## 2020-05-20 VITALS
BODY MASS INDEX: 17.83 KG/M2 | WEIGHT: 151 LBS | HEART RATE: 111 BPM | TEMPERATURE: 98.4 F | HEIGHT: 77 IN | SYSTOLIC BLOOD PRESSURE: 158 MMHG | DIASTOLIC BLOOD PRESSURE: 88 MMHG | OXYGEN SATURATION: 99 %

## 2020-05-20 DIAGNOSIS — R63.4 WEIGHT LOSS: ICD-10-CM

## 2020-05-20 DIAGNOSIS — R68.2 DRY MOUTH: Primary | ICD-10-CM

## 2020-05-20 DIAGNOSIS — R19.7 DIARRHEA, UNSPECIFIED TYPE: ICD-10-CM

## 2020-05-20 DIAGNOSIS — R51.9 ACUTE NONINTRACTABLE HEADACHE, UNSPECIFIED HEADACHE TYPE: ICD-10-CM

## 2020-05-20 DIAGNOSIS — F41.9 ANXIETY: ICD-10-CM

## 2020-05-20 LAB — GLUCOSE BLDC GLUCOMTR-MCNC: 91 MG/DL (ref 70–130)

## 2020-05-20 PROCEDURE — 83516 IMMUNOASSAY NONANTIBODY: CPT | Performed by: FAMILY MEDICINE

## 2020-05-20 PROCEDURE — 82728 ASSAY OF FERRITIN: CPT | Performed by: FAMILY MEDICINE

## 2020-05-20 PROCEDURE — 83013 H PYLORI (C-13) BREATH: CPT | Performed by: FAMILY MEDICINE

## 2020-05-20 PROCEDURE — 99214 OFFICE O/P EST MOD 30 MIN: CPT | Performed by: FAMILY MEDICINE

## 2020-05-20 PROCEDURE — 83615 LACTATE (LD) (LDH) ENZYME: CPT | Performed by: FAMILY MEDICINE

## 2020-05-20 PROCEDURE — 86671 FUNGUS NES ANTIBODY: CPT | Performed by: FAMILY MEDICINE

## 2020-05-20 PROCEDURE — 80050 GENERAL HEALTH PANEL: CPT | Performed by: FAMILY MEDICINE

## 2020-05-20 PROCEDURE — 83036 HEMOGLOBIN GLYCOSYLATED A1C: CPT | Performed by: FAMILY MEDICINE

## 2020-05-20 PROCEDURE — 84145 PROCALCITONIN (PCT): CPT | Performed by: FAMILY MEDICINE

## 2020-05-20 PROCEDURE — 82962 GLUCOSE BLOOD TEST: CPT | Performed by: FAMILY MEDICINE

## 2020-05-20 PROCEDURE — 82784 ASSAY IGA/IGD/IGG/IGM EACH: CPT | Performed by: FAMILY MEDICINE

## 2020-05-20 RX ORDER — PROPRANOLOL HYDROCHLORIDE 10 MG/1
10 TABLET ORAL 3 TIMES DAILY PRN
Qty: 90 TABLET | Refills: 1 | Status: SHIPPED | OUTPATIENT
Start: 2020-05-20 | End: 2020-08-12 | Stop reason: DRUGHIGH

## 2020-05-21 ENCOUNTER — LAB (OUTPATIENT)
Dept: FAMILY MEDICINE CLINIC | Facility: CLINIC | Age: 30
End: 2020-05-21

## 2020-05-21 ENCOUNTER — LAB (OUTPATIENT)
Dept: LAB | Facility: HOSPITAL | Age: 30
End: 2020-05-21

## 2020-05-21 DIAGNOSIS — R51.9 ACUTE NONINTRACTABLE HEADACHE, UNSPECIFIED HEADACHE TYPE: ICD-10-CM

## 2020-05-21 DIAGNOSIS — R19.7 DIARRHEA, UNSPECIFIED TYPE: ICD-10-CM

## 2020-05-21 DIAGNOSIS — R63.4 WEIGHT LOSS: ICD-10-CM

## 2020-05-21 DIAGNOSIS — R68.2 DRY MOUTH: ICD-10-CM

## 2020-05-21 LAB
ADV 40+41 DNA STL QL NAA+NON-PROBE: NOT DETECTED
ALBUMIN SERPL-MCNC: 5.3 G/DL (ref 3.5–5.2)
ALBUMIN/GLOB SERPL: 2 G/DL
ALP SERPL-CCNC: 52 U/L (ref 39–117)
ALT SERPL W P-5'-P-CCNC: 11 U/L (ref 1–41)
ANION GAP SERPL CALCULATED.3IONS-SCNC: 12.6 MMOL/L (ref 5–15)
AST SERPL-CCNC: 15 U/L (ref 1–40)
ASTRO TYP 1-8 RNA STL QL NAA+NON-PROBE: NOT DETECTED
BASOPHILS # BLD AUTO: 0.07 10*3/MM3 (ref 0–0.2)
BASOPHILS NFR BLD AUTO: 0.6 % (ref 0–1.5)
BILIRUB SERPL-MCNC: 0.7 MG/DL (ref 0.2–1.2)
BUN BLD-MCNC: 11 MG/DL (ref 6–20)
BUN/CREAT SERPL: 12.2 (ref 7–25)
C CAYETANENSIS DNA STL QL NAA+NON-PROBE: NOT DETECTED
CALCIUM SPEC-SCNC: 9.9 MG/DL (ref 8.6–10.5)
CAMPY SP DNA.DIARRHEA STL QL NAA+PROBE: NOT DETECTED
CHLORIDE SERPL-SCNC: 103 MMOL/L (ref 98–107)
CO2 SERPL-SCNC: 23.4 MMOL/L (ref 22–29)
CREAT BLD-MCNC: 0.9 MG/DL (ref 0.76–1.27)
CRYPTOSP STL CULT: NOT DETECTED
DEPRECATED RDW RBC AUTO: 41 FL (ref 37–54)
E COLI DNA SPEC QL NAA+PROBE: NOT DETECTED
E HISTOLYT AG STL-ACNC: NOT DETECTED
EAEC PAA PLAS AGGR+AATA ST NAA+NON-PRB: DETECTED
EC STX1 + STX2 GENES STL NAA+PROBE: NOT DETECTED
EOSINOPHIL # BLD AUTO: 0.02 10*3/MM3 (ref 0–0.4)
EOSINOPHIL NFR BLD AUTO: 0.2 % (ref 0.3–6.2)
EPEC EAE GENE STL QL NAA+NON-PROBE: NOT DETECTED
ERYTHROCYTE [DISTWIDTH] IN BLOOD BY AUTOMATED COUNT: 12.5 % (ref 12.3–15.4)
ETEC LTA+ST1A+ST1B TOX ST NAA+NON-PROBE: NOT DETECTED
FERRITIN SERPL-MCNC: <0.5 NG/ML (ref 30–400)
G LAMBLIA DNA SPEC QL NAA+PROBE: NOT DETECTED
GFR SERPL CREATININE-BSD FRML MDRD: 100 ML/MIN/1.73
GLOBULIN UR ELPH-MCNC: 2.7 GM/DL
GLUCOSE BLD-MCNC: 93 MG/DL (ref 65–99)
HBA1C MFR BLD: 5.09 % (ref 4.8–5.6)
HCT VFR BLD AUTO: 46.5 % (ref 37.5–51)
HGB BLD-MCNC: 16.8 G/DL (ref 13–17.7)
IMM GRANULOCYTES # BLD AUTO: 0.04 10*3/MM3 (ref 0–0.05)
IMM GRANULOCYTES NFR BLD AUTO: 0.4 % (ref 0–0.5)
LDH SERPL-CCNC: 146 U/L (ref 135–225)
LYMPHOCYTES # BLD AUTO: 1.47 10*3/MM3 (ref 0.7–3.1)
LYMPHOCYTES NFR BLD AUTO: 13.4 % (ref 19.6–45.3)
MCH RBC QN AUTO: 32.8 PG (ref 26.6–33)
MCHC RBC AUTO-ENTMCNC: 36.1 G/DL (ref 31.5–35.7)
MCV RBC AUTO: 90.8 FL (ref 79–97)
MONOCYTES # BLD AUTO: 0.65 10*3/MM3 (ref 0.1–0.9)
MONOCYTES NFR BLD AUTO: 5.9 % (ref 5–12)
NEUTROPHILS # BLD AUTO: 8.68 10*3/MM3 (ref 1.7–7)
NEUTROPHILS NFR BLD AUTO: 79.5 % (ref 42.7–76)
NOROVIRUS GI+II RNA STL QL NAA+NON-PROBE: DETECTED
NRBC BLD AUTO-RTO: 0 /100 WBC (ref 0–0.2)
P SHIGELLOIDES DNA STL QL NAA+PROBE: NOT DETECTED
PLATELET # BLD AUTO: 208 10*3/MM3 (ref 140–450)
PMV BLD AUTO: 12 FL (ref 6–12)
POTASSIUM BLD-SCNC: 4.5 MMOL/L (ref 3.5–5.2)
PROCALCITONIN SERPL-MCNC: <0.02 NG/ML (ref 0.1–0.25)
PROT SERPL-MCNC: 8 G/DL (ref 6–8.5)
RBC # BLD AUTO: 5.12 10*6/MM3 (ref 4.14–5.8)
RV RNA STL NAA+PROBE: NOT DETECTED
SALMONELLA DNA SPEC QL NAA+PROBE: NOT DETECTED
SAPO I+II+IV+V RNA STL QL NAA+NON-PROBE: NOT DETECTED
SHIGELLA SP+EIEC IPAH STL QL NAA+PROBE: NOT DETECTED
SODIUM BLD-SCNC: 139 MMOL/L (ref 136–145)
TSH SERPL DL<=0.05 MIU/L-ACNC: 1.31 UIU/ML (ref 0.27–4.2)
V CHOLERAE DNA SPEC QL NAA+PROBE: NOT DETECTED
VIBRIO DNA SPEC NAA+PROBE: NOT DETECTED
WBC NRBC COR # BLD: 10.93 10*3/MM3 (ref 3.4–10.8)
YERSINIA STL CULT: NOT DETECTED

## 2020-05-21 PROCEDURE — 0097U HC BIOFIRE FILMARRAY GI PANEL: CPT | Performed by: FAMILY MEDICINE

## 2020-05-21 PROCEDURE — U0003 INFECTIOUS AGENT DETECTION BY NUCLEIC ACID (DNA OR RNA); SEVERE ACUTE RESPIRATORY SYNDROME CORONAVIRUS 2 (SARS-COV-2) (CORONAVIRUS DISEASE [COVID-19]), AMPLIFIED PROBE TECHNIQUE, MAKING USE OF HIGH THROUGHPUT TECHNOLOGIES AS DESCRIBED BY CMS-2020-01-R: HCPCS

## 2020-05-21 PROCEDURE — 87324 CLOSTRIDIUM AG IA: CPT | Performed by: FAMILY MEDICINE

## 2020-05-21 PROCEDURE — 87205 SMEAR GRAM STAIN: CPT | Performed by: FAMILY MEDICINE

## 2020-05-22 ENCOUNTER — TELEPHONE (OUTPATIENT)
Dept: FAMILY MEDICINE CLINIC | Facility: CLINIC | Age: 30
End: 2020-05-22

## 2020-05-22 LAB
GLIADIN PEPTIDE IGA SER-ACNC: 4 UNITS (ref 0–19)
IGA SERPL-MCNC: 290 MG/DL (ref 90–386)
TTG IGA SER-ACNC: <2 U/ML (ref 0–3)
UREA BREATH TEST QL: NEGATIVE

## 2020-05-22 RX ORDER — CIPROFLOXACIN 500 MG/1
500 TABLET, FILM COATED ORAL 2 TIMES DAILY
Qty: 20 TABLET | Refills: 0 | Status: SHIPPED | OUTPATIENT
Start: 2020-05-22 | End: 2020-06-03

## 2020-05-22 NOTE — TELEPHONE ENCOUNTER
----- Message from Kalyani Villagran MD sent at 5/22/2020  4:27 AM EDT -----  Please call Asif. I have a start of an answer for him. 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 He needs to get a lot of fluids and rest. There is a lot of people at the house - if he can be using a separate bathroom that would help limit contamination and spread. How many days has it been? How is he feeling? Norovirus usually we let run its course. If it's been over a week, usually we give cipro for EAEC. I'm waiting for some more cultures because it may change the antibiotic treatment.      Spoke with patient and made him aware. He says it has been about a week now and he is still having a lot of nausea and diarrhea.

## 2020-05-23 LAB
C DIFF TOX A+B STL QL IA: NEGATIVE
SARS-COV-2 RNA RESP QL NAA+PROBE: NOT DETECTED

## 2020-05-26 ENCOUNTER — OFFICE VISIT (OUTPATIENT)
Dept: FAMILY MEDICINE CLINIC | Facility: CLINIC | Age: 30
End: 2020-05-26

## 2020-05-26 VITALS
BODY MASS INDEX: 18.14 KG/M2 | DIASTOLIC BLOOD PRESSURE: 72 MMHG | TEMPERATURE: 98.9 F | HEART RATE: 88 BPM | HEIGHT: 77 IN | WEIGHT: 153.6 LBS | SYSTOLIC BLOOD PRESSURE: 122 MMHG | OXYGEN SATURATION: 97 %

## 2020-05-26 DIAGNOSIS — A04.4 E COLI ENTERITIS: ICD-10-CM

## 2020-05-26 DIAGNOSIS — A08.11 GASTROENTERITIS DUE TO NOROVIRUS: Primary | ICD-10-CM

## 2020-05-26 LAB
BAKER'S YEAST IGG QN: 30 UNITS (ref 0–24.9)
Lab: NORMAL
SACCHAROMYCES CEREVISIAE AB IGA: 28 UNITS (ref 0–24.9)
WBC STL QL MICRO: NORMAL

## 2020-05-26 PROCEDURE — 99213 OFFICE O/P EST LOW 20 MIN: CPT | Performed by: FAMILY MEDICINE

## 2020-05-27 ENCOUNTER — OFFICE VISIT (OUTPATIENT)
Dept: SURGERY | Facility: CLINIC | Age: 30
End: 2020-05-27

## 2020-05-27 VITALS
HEART RATE: 89 BPM | BODY MASS INDEX: 18.07 KG/M2 | WEIGHT: 153 LBS | DIASTOLIC BLOOD PRESSURE: 70 MMHG | SYSTOLIC BLOOD PRESSURE: 111 MMHG | HEIGHT: 77 IN

## 2020-05-27 DIAGNOSIS — K60.3 ANAL FISTULA: Primary | ICD-10-CM

## 2020-05-27 PROCEDURE — 99242 OFF/OP CONSLTJ NEW/EST SF 20: CPT | Performed by: SURGERY

## 2020-05-27 NOTE — PROGRESS NOTES
Subjective   Asif Medina is a 29 y.o. male is being seen for consultation today at the request of Kalyani Villagran MD    Asif Medina is a 29 y.o. male With a history of right buttock abscess.  Incision and drainage was performed but he has continued induration and drainage from this area.  He feels possible communication but no definitive fistula is noted.  No history of Crohn's disease or ulcerative colitis.      History reviewed. No pertinent past medical history.    Family History   Problem Relation Age of Onset   • Hypertension Mother    • Kidney nephrosis Mother    • Cancer Father    • Anxiety disorder Father    • Bipolar disorder Father    • Depression Father    • Alcohol abuse Father    • Alcohol abuse Paternal Uncle    • Heart disease Maternal Grandmother    • Diabetes Maternal Grandmother    • Hypertension Maternal Grandmother    • Heart disease Maternal Grandfather    • Diabetes Maternal Grandfather    • Hypertension Maternal Grandfather    • Heart disease Paternal Grandmother    • Diabetes Paternal Grandmother    • Hypertension Paternal Grandmother    • Heart disease Paternal Grandfather    • Diabetes Paternal Grandfather    • Hypertension Paternal Grandfather    • Cancer Paternal Grandfather    • Bipolar disorder Paternal Grandfather        Social History     Socioeconomic History   • Marital status:      Spouse name: Not on file   • Number of children: Not on file   • Years of education: Not on file   • Highest education level: Not on file   Tobacco Use   • Smoking status: Current Every Day Smoker     Packs/day: 0.50     Years: 10.00     Pack years: 5.00     Types: Cigarettes   • Smokeless tobacco: Never Used   Substance and Sexual Activity   • Alcohol use: No   • Drug use: No   • Sexual activity: Defer       Past Surgical History:   Procedure Laterality Date   • INCISION AND DRAINAGE OF WOUND     • WISDOM TOOTH EXTRACTION  10/30/2018       Review of Systems   Constitutional: Negative for  "activity change, appetite change, chills and fever.   HENT: Negative for sore throat and trouble swallowing.    Eyes: Negative for visual disturbance.   Respiratory: Negative for cough and shortness of breath.    Cardiovascular: Negative for chest pain and palpitations.   Gastrointestinal: Negative for abdominal distention, abdominal pain, blood in stool, constipation, diarrhea, nausea and vomiting.   Endocrine: Negative for cold intolerance and heat intolerance.   Genitourinary: Negative for dysuria.   Musculoskeletal: Negative for joint swelling.   Skin: Positive for wound. Negative for color change and rash.   Allergic/Immunologic: Negative for immunocompromised state.   Neurological: Negative for dizziness, seizures, weakness and headaches.   Hematological: Negative for adenopathy. Does not bruise/bleed easily.   Psychiatric/Behavioral: Negative for agitation and confusion.         /70   Pulse 89   Ht 195.6 cm (77.01\")   Wt 69.4 kg (153 lb)   BMI 18.14 kg/m²   Objective   Physical Exam   Constitutional: He is oriented to person, place, and time. He appears well-developed.   HENT:   Head: Normocephalic and atraumatic.   Mouth/Throat: Mucous membranes are normal.   Eyes: Pupils are equal, round, and reactive to light. Conjunctivae are normal.   Neck: Neck supple. No JVD present. No tracheal deviation present. No thyromegaly present.   Cardiovascular: Normal rate and regular rhythm. Exam reveals no gallop and no friction rub.   No murmur heard.  Pulmonary/Chest: Effort normal and breath sounds normal.   Abdominal: Soft. He exhibits no distension. There is no splenomegaly or hepatomegaly. There is no tenderness. No hernia.   Genitourinary:         Musculoskeletal: Normal range of motion. He exhibits no deformity.   Neurological: He is alert and oriented to person, place, and time.   Skin: Skin is warm and dry.   Psychiatric: He has a normal mood and affect.             Assessment   Asif was seen today " for abscess and rectal bleeding.    Diagnoses and all orders for this visit:    Anal fistula  -     Case Request; Standing  -     CBC and Differential; Future  -     Comprehensive metabolic panel; Future  -     Case Request    Other orders  -     Provide NPO Instructions to Patient; Future  -     Chlorhexidine Skin Prep; Future      Asif Medina is a 29 y.o. male with history of right buttock incision and drainage.  He feels this communicates consistent with an anal fistula.  The patient will undergo anal exploration under anesthesia with possible fistulotomy.  Incision and drainage at a minimum will be performed due to the chronic nature of the area on the buttock.  Patient's Body mass index is 18.14 kg/m². BMI is within normal parameters. No follow-up required..

## 2020-06-02 ENCOUNTER — TELEPHONE (OUTPATIENT)
Dept: FAMILY MEDICINE CLINIC | Facility: CLINIC | Age: 30
End: 2020-06-02

## 2020-06-02 ENCOUNTER — TRANSCRIBE ORDERS (OUTPATIENT)
Dept: ADMINISTRATIVE | Facility: HOSPITAL | Age: 30
End: 2020-06-02

## 2020-06-02 DIAGNOSIS — Z01.818 PRE-OPERATIVE CLEARANCE: Primary | ICD-10-CM

## 2020-06-02 NOTE — TELEPHONE ENCOUNTER
----- Message from Kalyani Villagran MD sent at 5/30/2020  2:54 PM EDT -----  Please call Asif. I had one other lab come back. He came back slightly positive for the possibility of an autoimmune GI disease. However, this could be a false positive secondary to his recent history of diarrhea as the numbers are not that elevated. I would retest him after several weeks of his diarrhea being completely resolved. Is he okay with that? Thanks.        Left a message to return call.    Patient notified & verbalized understanding,agreeable.

## 2020-06-03 ENCOUNTER — LAB (OUTPATIENT)
Dept: LAB | Facility: HOSPITAL | Age: 30
End: 2020-06-03

## 2020-06-03 ENCOUNTER — APPOINTMENT (OUTPATIENT)
Dept: PREADMISSION TESTING | Facility: HOSPITAL | Age: 30
End: 2020-06-03

## 2020-06-03 DIAGNOSIS — K60.3 ANAL FISTULA: ICD-10-CM

## 2020-06-03 DIAGNOSIS — Z01.818 PRE-OPERATIVE CLEARANCE: ICD-10-CM

## 2020-06-03 LAB
ALBUMIN SERPL-MCNC: 4.68 G/DL (ref 3.5–5.2)
ALBUMIN/GLOB SERPL: 1.6 G/DL
ALP SERPL-CCNC: 56 U/L (ref 39–117)
ALT SERPL W P-5'-P-CCNC: 13 U/L (ref 1–41)
ANION GAP SERPL CALCULATED.3IONS-SCNC: 12.1 MMOL/L (ref 5–15)
AST SERPL-CCNC: 16 U/L (ref 1–40)
BASOPHILS # BLD AUTO: 0.1 10*3/MM3 (ref 0–0.2)
BASOPHILS NFR BLD AUTO: 1.3 % (ref 0–1.5)
BILIRUB SERPL-MCNC: 0.6 MG/DL (ref 0.2–1.2)
BUN BLD-MCNC: 13 MG/DL (ref 6–20)
BUN/CREAT SERPL: 13.3 (ref 7–25)
CALCIUM SPEC-SCNC: 9.6 MG/DL (ref 8.6–10.5)
CHLORIDE SERPL-SCNC: 103 MMOL/L (ref 98–107)
CO2 SERPL-SCNC: 22.9 MMOL/L (ref 22–29)
CREAT BLD-MCNC: 0.98 MG/DL (ref 0.76–1.27)
DEPRECATED RDW RBC AUTO: 43 FL (ref 37–54)
EOSINOPHIL # BLD AUTO: 0.19 10*3/MM3 (ref 0–0.4)
EOSINOPHIL NFR BLD AUTO: 2.4 % (ref 0.3–6.2)
ERYTHROCYTE [DISTWIDTH] IN BLOOD BY AUTOMATED COUNT: 12.4 % (ref 12.3–15.4)
GFR SERPL CREATININE-BSD FRML MDRD: 90 ML/MIN/1.73
GLOBULIN UR ELPH-MCNC: 2.9 GM/DL
GLUCOSE BLD-MCNC: 120 MG/DL (ref 65–99)
HCT VFR BLD AUTO: 47.2 % (ref 37.5–51)
HGB BLD-MCNC: 16 G/DL (ref 13–17.7)
IMM GRANULOCYTES # BLD AUTO: 0.01 10*3/MM3 (ref 0–0.05)
IMM GRANULOCYTES NFR BLD AUTO: 0.1 % (ref 0–0.5)
LYMPHOCYTES # BLD AUTO: 1.91 10*3/MM3 (ref 0.7–3.1)
LYMPHOCYTES NFR BLD AUTO: 24.1 % (ref 19.6–45.3)
MCH RBC QN AUTO: 31.8 PG (ref 26.6–33)
MCHC RBC AUTO-ENTMCNC: 33.9 G/DL (ref 31.5–35.7)
MCV RBC AUTO: 93.8 FL (ref 79–97)
MONOCYTES # BLD AUTO: 0.63 10*3/MM3 (ref 0.1–0.9)
MONOCYTES NFR BLD AUTO: 7.9 % (ref 5–12)
NEUTROPHILS # BLD AUTO: 5.1 10*3/MM3 (ref 1.7–7)
NEUTROPHILS NFR BLD AUTO: 64.2 % (ref 42.7–76)
NRBC BLD AUTO-RTO: 0 /100 WBC (ref 0–0.2)
PLATELET # BLD AUTO: 196 10*3/MM3 (ref 140–450)
PMV BLD AUTO: 11.1 FL (ref 6–12)
POTASSIUM BLD-SCNC: 3.8 MMOL/L (ref 3.5–5.2)
PROT SERPL-MCNC: 7.6 G/DL (ref 6–8.5)
RBC # BLD AUTO: 5.03 10*6/MM3 (ref 4.14–5.8)
REF LAB TEST METHOD: NORMAL
SARS-COV-2 RNA RESP QL NAA+PROBE: NOT DETECTED
SODIUM BLD-SCNC: 138 MMOL/L (ref 136–145)
WBC NRBC COR # BLD: 7.94 10*3/MM3 (ref 3.4–10.8)

## 2020-06-03 PROCEDURE — U0004 COV-19 TEST NON-CDC HGH THRU: HCPCS

## 2020-06-03 PROCEDURE — 36415 COLL VENOUS BLD VENIPUNCTURE: CPT

## 2020-06-03 PROCEDURE — U0002 COVID-19 LAB TEST NON-CDC: HCPCS

## 2020-06-03 PROCEDURE — C9803 HOPD COVID-19 SPEC COLLECT: HCPCS | Performed by: SURGERY

## 2020-06-03 PROCEDURE — 80053 COMPREHEN METABOLIC PANEL: CPT | Performed by: SURGERY

## 2020-06-03 PROCEDURE — 85025 COMPLETE CBC W/AUTO DIFF WBC: CPT | Performed by: SURGERY

## 2020-06-03 NOTE — DISCHARGE INSTRUCTIONS
You are scheduled to arrive for your surgery on Friday, June 5th at 10:15 AM    TAKE the following medications the morning of surgery:    All heart or blood pressure medications    Please discontinue all blood thinners and anticoagulants (except aspirin) prior to surgery as per your surgeon and cardiologist instructions.  Aspirin may be continued up to the day prior to surgery.    HOLD all diabetic medications the morning of surgery as order by physician.    Please follow instructions on use of prep cloths provided by nurse. Return instruction sheet to pre-op nurse on day of surgery.    General Instructions:  • Do NOT eat or drink after midnight which includes water, mints, or gum.  • You may brush your teeth. Dental appliances that are removable must be taken out day of surgery.  • Do NOT smoke, chew tobacco, or drink alcohol within 24 hours prior to surgery.  • Bring medications in original bottles, any inhalers and if applicable your C-PAP/BI-PAP machine  • Bring any papers given to you in the doctor’s office  • Wear clean, comfortable clothes and socks  • Do NOT wear contact lenses or make-up or dark nail polish.  Bring a case for your glasses if applicable.  • Bring crutches or walker if applicable  • Leave all other valuables and jewelry at home  • If you were given a blood bank armband, continue to wear it until discharged.    Preventing a Surgical Site Infection:  • Shower the night before surgery (unless instructed otherwise) using a fresh bar of anti-bacterial soap (such as Dial) and clean washcloth.  Dry with a clean towel and dress in clean clothing.  • For 2 to 3 days before surgery, avoid shaving with a razor near where you will have surgery because the razor can irritate skin and make it easier to develop an infection.  Ask your surgeon if you will be receiving antibiotics prior to surgery.  • Make sure you, your family, and all healthcare providers clean their hands with soap and water or an  alcohol-based hand  before caring for you or your wound.  • If at all possible, quit smoking as many days before surgery as you can.    Day of Surgery:  Upon arrival, a pre-op nurse and anesthesiologist will review your health history, obtain vital signs, and answer questions you may have.  The only belongings needed at this time will be your home medications and if applicable you C-PAP/BI-PAP machine.  If you are staying overnight, your family can leave the rest of your belongings in the car and bring them to your room later.  A pre-op nurse will start an IV and you may receive medication in preparation for surgery.  Due to patient privacy and limited space, only one member of your family will be able to accompany you in the pre-op area.  While you are in surgery your family should notify the waiting room  if they leave the waiting room area and provide a contact number.  Please be aware that surgery does come with discomfort.  We want to make every effort to control your discomfort so please discuss any uncontrolled symptoms with your nurse.  Your doctor will most likely have prescribed pain medications.  If you are going home after surgery you will receive individualized written care instructions before being discharged.  A responsible adult must drive you to and from the hospital on the day of surgery and stay with you for 24 hours.  If you are staying overnight following surgery, you will be transported to your hospital room following the recovery period.    A RESPONSIBLE PERSON MUST REMAIN IN THE WAITING ROOM DURING YOUR PROCEDURE AND A RESPONSIBLE  MUST BE AVAILABLE UPON YOUR DISCHARGE.

## 2020-06-05 ENCOUNTER — HOSPITAL ENCOUNTER (OUTPATIENT)
Facility: HOSPITAL | Age: 30
Setting detail: HOSPITAL OUTPATIENT SURGERY
Discharge: HOME | End: 2020-06-05
Attending: SURGERY | Admitting: SURGERY

## 2020-06-05 ENCOUNTER — ANESTHESIA EVENT (OUTPATIENT)
Dept: PERIOP | Facility: HOSPITAL | Age: 30
End: 2020-06-05

## 2020-06-05 ENCOUNTER — ANESTHESIA (OUTPATIENT)
Dept: PERIOP | Facility: HOSPITAL | Age: 30
End: 2020-06-05

## 2020-06-05 VITALS
HEART RATE: 82 BPM | TEMPERATURE: 97.5 F | RESPIRATION RATE: 16 BRPM | OXYGEN SATURATION: 98 % | DIASTOLIC BLOOD PRESSURE: 80 MMHG | WEIGHT: 149.38 LBS | SYSTOLIC BLOOD PRESSURE: 112 MMHG | HEIGHT: 78 IN | BODY MASS INDEX: 17.28 KG/M2

## 2020-06-05 DIAGNOSIS — L02.31 CUTANEOUS ABSCESS OF BUTTOCK: Primary | ICD-10-CM

## 2020-06-05 DIAGNOSIS — K60.3 ANAL FISTULA: ICD-10-CM

## 2020-06-05 PROCEDURE — 25010000003 BUPIVACAINE LIPOSOME 1.3 % SUSPENSION: Performed by: SURGERY

## 2020-06-05 PROCEDURE — 25010000002 PROPOFOL 10 MG/ML EMULSION: Performed by: NURSE ANESTHETIST, CERTIFIED REGISTERED

## 2020-06-05 PROCEDURE — 25010000002 HYDROMORPHONE PER 4 MG: Performed by: NURSE ANESTHETIST, CERTIFIED REGISTERED

## 2020-06-05 PROCEDURE — 25010000002 FENTANYL CITRATE (PF) 100 MCG/2ML SOLUTION: Performed by: NURSE ANESTHETIST, CERTIFIED REGISTERED

## 2020-06-05 PROCEDURE — 25010000002 ONDANSETRON PER 1 MG: Performed by: NURSE ANESTHETIST, CERTIFIED REGISTERED

## 2020-06-05 PROCEDURE — 46270 REMOVE ANAL FIST SUBQ: CPT | Performed by: SURGERY

## 2020-06-05 PROCEDURE — 25010000002 MIDAZOLAM PER 1 MG: Performed by: NURSE ANESTHETIST, CERTIFIED REGISTERED

## 2020-06-05 PROCEDURE — C9290 INJ, BUPIVACAINE LIPOSOME: HCPCS | Performed by: SURGERY

## 2020-06-05 PROCEDURE — 94799 UNLISTED PULMONARY SVC/PX: CPT

## 2020-06-05 PROCEDURE — 25010000002 MIDAZOLAM PER 1MG: Performed by: ANESTHESIOLOGY

## 2020-06-05 RX ORDER — PROPOFOL 10 MG/ML
VIAL (ML) INTRAVENOUS AS NEEDED
Status: DISCONTINUED | OUTPATIENT
Start: 2020-06-05 | End: 2020-06-05 | Stop reason: SURG

## 2020-06-05 RX ORDER — ONDANSETRON 2 MG/ML
4 INJECTION INTRAMUSCULAR; INTRAVENOUS AS NEEDED
Status: DISCONTINUED | OUTPATIENT
Start: 2020-06-05 | End: 2020-06-05 | Stop reason: HOSPADM

## 2020-06-05 RX ORDER — ONDANSETRON 2 MG/ML
INJECTION INTRAMUSCULAR; INTRAVENOUS AS NEEDED
Status: DISCONTINUED | OUTPATIENT
Start: 2020-06-05 | End: 2020-06-05 | Stop reason: SURG

## 2020-06-05 RX ORDER — MIDAZOLAM HYDROCHLORIDE 1 MG/ML
INJECTION INTRAMUSCULAR; INTRAVENOUS AS NEEDED
Status: DISCONTINUED | OUTPATIENT
Start: 2020-06-05 | End: 2020-06-05 | Stop reason: SURG

## 2020-06-05 RX ORDER — FENTANYL CITRATE 50 UG/ML
50 INJECTION, SOLUTION INTRAMUSCULAR; INTRAVENOUS
Status: DISCONTINUED | OUTPATIENT
Start: 2020-06-05 | End: 2020-06-05 | Stop reason: HOSPADM

## 2020-06-05 RX ORDER — SODIUM CHLORIDE, SODIUM LACTATE, POTASSIUM CHLORIDE, CALCIUM CHLORIDE 600; 310; 30; 20 MG/100ML; MG/100ML; MG/100ML; MG/100ML
125 INJECTION, SOLUTION INTRAVENOUS CONTINUOUS
Status: DISCONTINUED | OUTPATIENT
Start: 2020-06-05 | End: 2020-06-05 | Stop reason: HOSPADM

## 2020-06-05 RX ORDER — OXYCODONE HYDROCHLORIDE AND ACETAMINOPHEN 5; 325 MG/1; MG/1
1 TABLET ORAL ONCE AS NEEDED
Status: DISCONTINUED | OUTPATIENT
Start: 2020-06-05 | End: 2020-06-05 | Stop reason: HOSPADM

## 2020-06-05 RX ORDER — SODIUM CHLORIDE 0.9 % (FLUSH) 0.9 %
10 SYRINGE (ML) INJECTION EVERY 12 HOURS SCHEDULED
Status: DISCONTINUED | OUTPATIENT
Start: 2020-06-05 | End: 2020-06-05 | Stop reason: HOSPADM

## 2020-06-05 RX ORDER — FENTANYL CITRATE 50 UG/ML
INJECTION, SOLUTION INTRAMUSCULAR; INTRAVENOUS AS NEEDED
Status: DISCONTINUED | OUTPATIENT
Start: 2020-06-05 | End: 2020-06-05 | Stop reason: SURG

## 2020-06-05 RX ORDER — MAGNESIUM HYDROXIDE 1200 MG/15ML
LIQUID ORAL AS NEEDED
Status: DISCONTINUED | OUTPATIENT
Start: 2020-06-05 | End: 2020-06-05 | Stop reason: HOSPADM

## 2020-06-05 RX ORDER — LIDOCAINE HYDROCHLORIDE 20 MG/ML
INJECTION, SOLUTION INFILTRATION; PERINEURAL AS NEEDED
Status: DISCONTINUED | OUTPATIENT
Start: 2020-06-05 | End: 2020-06-05 | Stop reason: SURG

## 2020-06-05 RX ORDER — FAMOTIDINE 10 MG/ML
INJECTION, SOLUTION INTRAVENOUS AS NEEDED
Status: DISCONTINUED | OUTPATIENT
Start: 2020-06-05 | End: 2020-06-05 | Stop reason: SURG

## 2020-06-05 RX ORDER — MIDAZOLAM HYDROCHLORIDE 1 MG/ML
1 INJECTION INTRAMUSCULAR; INTRAVENOUS
Status: DISCONTINUED | OUTPATIENT
Start: 2020-06-05 | End: 2020-06-05 | Stop reason: HOSPADM

## 2020-06-05 RX ORDER — IPRATROPIUM BROMIDE AND ALBUTEROL SULFATE 2.5; .5 MG/3ML; MG/3ML
3 SOLUTION RESPIRATORY (INHALATION) ONCE AS NEEDED
Status: DISCONTINUED | OUTPATIENT
Start: 2020-06-05 | End: 2020-06-05 | Stop reason: HOSPADM

## 2020-06-05 RX ORDER — SODIUM CHLORIDE 0.9 % (FLUSH) 0.9 %
10 SYRINGE (ML) INJECTION AS NEEDED
Status: DISCONTINUED | OUTPATIENT
Start: 2020-06-05 | End: 2020-06-05 | Stop reason: HOSPADM

## 2020-06-05 RX ORDER — MEPERIDINE HYDROCHLORIDE 25 MG/ML
12.5 INJECTION INTRAMUSCULAR; INTRAVENOUS; SUBCUTANEOUS
Status: DISCONTINUED | OUTPATIENT
Start: 2020-06-05 | End: 2020-06-05 | Stop reason: HOSPADM

## 2020-06-05 RX ORDER — HYDROMORPHONE HCL 110MG/55ML
PATIENT CONTROLLED ANALGESIA SYRINGE INTRAVENOUS AS NEEDED
Status: DISCONTINUED | OUTPATIENT
Start: 2020-06-05 | End: 2020-06-05 | Stop reason: SURG

## 2020-06-05 RX ORDER — MIDAZOLAM HYDROCHLORIDE 1 MG/ML
2 INJECTION INTRAMUSCULAR; INTRAVENOUS
Status: DISCONTINUED | OUTPATIENT
Start: 2020-06-05 | End: 2020-06-05 | Stop reason: HOSPADM

## 2020-06-05 RX ORDER — TRAMADOL HYDROCHLORIDE 50 MG/1
50 TABLET ORAL EVERY 8 HOURS PRN
Qty: 8 TABLET | Refills: 0 | Status: SHIPPED | OUTPATIENT
Start: 2020-06-05 | End: 2020-07-21

## 2020-06-05 RX ADMIN — ONDANSETRON 4 MG: 2 INJECTION INTRAMUSCULAR; INTRAVENOUS at 11:18

## 2020-06-05 RX ADMIN — SODIUM CHLORIDE, POTASSIUM CHLORIDE, SODIUM LACTATE AND CALCIUM CHLORIDE 125 ML/HR: 600; 310; 30; 20 INJECTION, SOLUTION INTRAVENOUS at 10:25

## 2020-06-05 RX ADMIN — AMPICILLIN SODIUM AND SULBACTAM SODIUM 3 G: 2; 1 INJECTION, POWDER, FOR SOLUTION INTRAMUSCULAR; INTRAVENOUS at 11:15

## 2020-06-05 RX ADMIN — ONDANSETRON 4 MG: 2 INJECTION INTRAMUSCULAR; INTRAVENOUS at 12:19

## 2020-06-05 RX ADMIN — HYDROMORPHONE HYDROCHLORIDE 0.5 MG: 2 INJECTION, SOLUTION INTRAMUSCULAR; INTRAVENOUS; SUBCUTANEOUS at 11:45

## 2020-06-05 RX ADMIN — HYDROMORPHONE HYDROCHLORIDE 0.5 MG: 2 INJECTION, SOLUTION INTRAMUSCULAR; INTRAVENOUS; SUBCUTANEOUS at 11:35

## 2020-06-05 RX ADMIN — FAMOTIDINE 20 MG: 10 INJECTION INTRAVENOUS at 11:18

## 2020-06-05 RX ADMIN — FENTANYL CITRATE 100 MCG: 50 INJECTION INTRAMUSCULAR; INTRAVENOUS at 11:18

## 2020-06-05 RX ADMIN — MIDAZOLAM HYDROCHLORIDE 2 MG: 1 INJECTION, SOLUTION INTRAMUSCULAR; INTRAVENOUS at 10:26

## 2020-06-05 RX ADMIN — FENTANYL CITRATE 50 MCG: 50 INJECTION, SOLUTION INTRAMUSCULAR; INTRAVENOUS at 12:19

## 2020-06-05 RX ADMIN — PROPOFOL 200 MG: 10 INJECTION, EMULSION INTRAVENOUS at 11:22

## 2020-06-05 RX ADMIN — MIDAZOLAM HYDROCHLORIDE 2 MG: 1 INJECTION, SOLUTION INTRAMUSCULAR; INTRAVENOUS at 11:18

## 2020-06-05 RX ADMIN — LIDOCAINE HYDROCHLORIDE 40 MG: 20 INJECTION, SOLUTION INFILTRATION; PERINEURAL at 11:18

## 2020-06-05 RX ADMIN — HYDROMORPHONE HYDROCHLORIDE 0.5 MG: 2 INJECTION, SOLUTION INTRAMUSCULAR; INTRAVENOUS; SUBCUTANEOUS at 11:32

## 2020-06-05 RX ADMIN — HYDROMORPHONE HYDROCHLORIDE 0.5 MG: 2 INJECTION, SOLUTION INTRAMUSCULAR; INTRAVENOUS; SUBCUTANEOUS at 11:39

## 2020-06-05 NOTE — ANESTHESIA PREPROCEDURE EVALUATION
Anesthesia Evaluation     no history of anesthetic complications:  NPO Solid Status: > 8 hours  NPO Liquid Status: > 8 hours           Airway   Mallampati: II  TM distance: >3 FB  Neck ROM: full  No difficulty expected  Dental - normal exam     Pulmonary - normal exam   Cardiovascular - normal exam        Neuro/Psych  GI/Hepatic/Renal/Endo      Musculoskeletal     Abdominal  - normal exam    Bowel sounds: normal.   Substance History      OB/GYN          Other                        Anesthesia Plan    ASA 2     general     intravenous induction     Anesthetic plan, all risks, benefits, and alternatives have been provided, discussed and informed consent has been obtained with: patient.

## 2020-06-05 NOTE — ANESTHESIA PROCEDURE NOTES
Airway  Urgency: elective    Date/Time: 6/5/2020 11:22 AM  Airway not difficult    General Information and Staff    Patient location during procedure: OR  Anesthesiologist: Miller Abernathy MD  CRNA: Ivania Eisenberg CRNA    Indications and Patient Condition  Indications for airway management: airway protection    Preoxygenated: yes  Mask difficulty assessment: 0 - not attempted    Final Airway Details  Final airway type: supraglottic airway      Successful airway: classic  Size 3    Number of attempts at approach: 1  Assessment: lips, teeth, and gum same as pre-op    Additional Comments  LMA placed with no trauma noted. Patient tolerated well. Good seal. Secured.

## 2020-06-05 NOTE — ANESTHESIA POSTPROCEDURE EVALUATION
Patient: Asif Medina    Procedure Summary     Date:  06/05/20 Room / Location:  [facility] OR  /  [facility] OR    Anesthesia Start:  1115 Anesthesia Stop:  1157    Procedure:  RECTAL FISTULOTOMY (N/A Rectum) Diagnosis:       Anal fistula      (Anal fistula [K60.3])    Surgeon:  Nixon Shah MD Provider:  Miller Abernathy MD    Anesthesia Type:  general ASA Status:  2          Anesthesia Type: general    Vitals  Vitals Value Taken Time   /74 6/5/2020 12:08 PM   Temp 97.2 °F (36.2 °C) 6/5/2020 11:58 AM   Pulse 63 6/5/2020 12:08 PM   Resp 12 6/5/2020 12:08 PM   SpO2 96 % 6/5/2020 12:08 PM           Post Anesthesia Care and Evaluation    Patient location during evaluation: bedside  Patient participation: complete - patient participated  Level of consciousness: awake and alert  Pain score: 1  Pain management: adequate  Airway patency: patent  Anesthetic complications: No anesthetic complications  PONV Status: none  Cardiovascular status: acceptable  Respiratory status: acceptable  Hydration status: acceptable

## 2020-06-05 NOTE — OP NOTE
RECTAL FISTULOTOMY  Procedure Note    Asif Medina  6/5/2020    Pre-op Diagnosis:   Anal fistula [K60.3]    Post-op Diagnosis:     Post-Op Diagnosis Codes:     * Anal fistula [K60.3]    Procedure(s):  RECTAL FISTULOTOMY    Surgeon(s):  Nixon Shah MD    Anesthesia: General    Staff:   Circulator: Max Nicholson RN  Scrub Person: Natalee Fowler  Assistant: Esdras Montaño    Findings: chronic wound cavity right buttock.  Possible fistula partially tracking posteriorly toward midline but no internal anal communication noted.  Old granulation tissue of buttock wound excised and incision closed.    Operative Procedure:  The patient was taken to the OR and place in lithotomy position with candy cane stirrups after induction of general anesthesia.  The patient anus and perineum was prepped and draped in the usual sterile fashion. Timeout was performed.  PASCALE was negative.  The patient had a draining wound of the right buttock that tracked posteriorly.  The wound was opened along a probe and granulation tissue was removed with cautery.  There was a tracking sinus that travelled toward the sphincter complex in a posterior and lateral location but no internal opening was identified after evaluation and injection of hydrogen peroxide.  The incision was then irrigated and closed with interrupted chromic suture.  The patient was awakened from anesthesia and taken to recovery.    Estimated Blood Loss: 5mL    Specimens:   none           Drains: none    Grafts/Implants:  none    Complications: none      Nixon Shah MD     Date: 6/5/2020  Time: 11:58

## 2020-06-08 NOTE — PROGRESS NOTES
"Asif Medina     VITALS: Blood pressure 158/88, pulse 111, temperature 98.4 °F (36.9 °C), temperature source Skin, height 195.6 cm (77\"), weight 68.5 kg (151 lb), SpO2 99 %.    Subjective  Chief Complaint:   Chief Complaint   Patient presents with   • Anorexia   • Dry Mouth        History of Present Illness:  Patient is a 29 y.o.  male with a medical history significant for migraines who presents to clinic secondary to an acute concern.  Patient states that he is having a 8-day history of dry mouth, diarrhea, weight loss, and acute headaches.  He states that he has having 7-8 episodes of diarrhea a day.  He states that the diarrhea is dark green and dark gray.  He states that it is making him weak.  He states that he has not had a appetite.  As a result, he has lost approximately 6 pounds.  He states that occasionally he is having cramping in his abdomen.  This has all increased his anxiety.  He denies any fevers or chills.  He denies any nausea or vomiting.  No one else at home with similar symptoms.  He has not tried anything to alleviate his symptoms.    No complaints about any of the medications.    The following portions of the patient's history were reviewed and updated as appropriate: allergies, current medications, past family history, past medical history, past social history, past surgical history and problem list.    Past Medical History  Past Medical History:   Diagnosis Date   • Anal fistula    • Anxiety        Review of Systems   Respiratory: Negative for shortness of breath and wheezing.    Cardiovascular: Negative for chest pain and palpitations.   Gastrointestinal: Positive for diarrhea. Negative for constipation, nausea and vomiting.       Surgical History  Past Surgical History:   Procedure Laterality Date   • INCISION AND DRAINAGE OF WOUND     • WISDOM TOOTH EXTRACTION  10/30/2018       Family History  Family History   Problem Relation Age of Onset   • Hypertension Mother    • Kidney nephrosis " Mother    • Cancer Father    • Anxiety disorder Father    • Bipolar disorder Father    • Depression Father    • Alcohol abuse Father    • Alcohol abuse Paternal Uncle    • Heart disease Maternal Grandmother    • Diabetes Maternal Grandmother    • Hypertension Maternal Grandmother    • Heart disease Maternal Grandfather    • Diabetes Maternal Grandfather    • Hypertension Maternal Grandfather    • Heart disease Paternal Grandmother    • Diabetes Paternal Grandmother    • Hypertension Paternal Grandmother    • Heart disease Paternal Grandfather    • Diabetes Paternal Grandfather    • Hypertension Paternal Grandfather    • Cancer Paternal Grandfather    • Bipolar disorder Paternal Grandfather        Social History  Social History     Socioeconomic History   • Marital status:      Spouse name: Not on file   • Number of children: Not on file   • Years of education: Not on file   • Highest education level: Not on file   Tobacco Use   • Smoking status: Current Every Day Smoker     Packs/day: 1.00     Years: 10.00     Pack years: 10.00     Types: Cigarettes   • Smokeless tobacco: Never Used   Substance and Sexual Activity   • Alcohol use: No   • Drug use: No   • Sexual activity: Defer       Objective  Physical Exam    Gen: Patient in NAD. Pleasant and answers appropriately. A&Ox3.    Skin: Warm and dry with normal turgor. No purpura, rashes, or unusual pigmentation noted. Hair is normal in appearance and distribution.    HEENT: NC/AT. No lesions noted. Conjunctiva clear, sclera nonicteric. PERRL. EOMI without nystagmus or strabismus. Fundi appear benign. No hemorrhages or exudates of eyes. Auditory canals are patent bilaterally without lesions. TMs intact,  nonerythematous, nonbulging without lesions. Nasal mucosa pink, nonerythematous, and nonedematous. Frontal and maxillary sinuses are nontender. O/P nonerythematous and moist without exudate.    Neck: Supple without lymph nodes palpated. FROM.     Lungs: CTA B/L  without rales, rhonchi, crackles, or wheezes.    Heart: RRR. S1 and S2 normal. No S3 or S4. No MRGT.    Abd: Soft, tender diffusely, slightly distended. (+)BSx4 quadrants, none hyperechoic or hypoechoic.  No HSM, masses, or bruits.    Extrem: No CCE. Radial pulses 2+/4 and equal B/L. FROMx4.     Neuro: No focal motor/sensory deficits.    Procedures    Assessment/Plan  Asif Medina is a 29 y.o. here for medical followup.  Diagnoses and all orders for this visit:    Dry mouth  -     POCT Glucose  -     SARS-CoV-2, KOFI ([facility]) - Swab, Nasopharynx; Future  -     Gastrointestinal Panel, PCR - Stool, Per Rectum; Future  -     Celiac Disease Antibody Screen; Future  -     Saccharomyces Cerevisiae Antibodies, IgG & IgA; Future  -     CBC & Differential; Future  -     Comprehensive Metabolic Panel; Future  -     TSH; Future  -     Hemoglobin A1c; Future  -     H. Pylori Breath Test - Breath, Lung; Future  -     Lactate Dehydrogenase; Future  -     Ferritin; Future  -     Procalcitonin; Future  -     Celiac Disease Antibody Screen  -     Saccharomyces Cerevisiae Antibodies, IgG & IgA  -     CBC & Differential  -     Comprehensive Metabolic Panel  -     TSH  -     Hemoglobin A1c  -     H. Pylori Breath Test - Breath, Lung  -     Lactate Dehydrogenase  -     Ferritin  -     Procalcitonin  -     CBC Auto Differential    Diarrhea, unspecified type  -     SARS-CoV-2, KOFI ([facility]) - Swab, Nasopharynx; Future  -     Clostridium Difficile EIA - Stool, Per Rectum; Future  -     Stool Culture ([facility]) - Stool, Per Rectum; Future  -     Ova & Parasite Examination - Stool, Per Rectum; Future  -     Fecal Leukocytes - Stool, Per Rectum; Future  -     Gastrointestinal Panel, PCR - Stool, Per Rectum; Future  -     Celiac Disease Antibody Screen; Future  -     Saccharomyces Cerevisiae Antibodies, IgG & IgA; Future  -     CBC & Differential; Future  -     Comprehensive Metabolic Panel; Future  -     TSH; Future  -     Hemoglobin  A1c; Future  -     H. Pylori Breath Test - Breath, Lung; Future  -     Lactate Dehydrogenase; Future  -     Ferritin; Future  -     Procalcitonin; Future  -     Celiac Disease Antibody Screen  -     Saccharomyces Cerevisiae Antibodies, IgG & IgA  -     CBC & Differential  -     Comprehensive Metabolic Panel  -     TSH  -     Hemoglobin A1c  -     H. Pylori Breath Test - Breath, Lung  -     Lactate Dehydrogenase  -     Ferritin  -     Procalcitonin  -     CBC Auto Differential  Uncertain etiology.  Most likely secondary to infectious etiology.  Will test for COVID.  We will also have patient get stool samples.  Lab work-up done.  Patient return in a week.    Weight loss  -     SARS-CoV-2, KOFI ([facility]) - Swab, Nasopharynx; Future  -     Gastrointestinal Panel, PCR - Stool, Per Rectum; Future  -     Celiac Disease Antibody Screen; Future  -     Saccharomyces Cerevisiae Antibodies, IgG & IgA; Future  -     CBC & Differential; Future  -     Comprehensive Metabolic Panel; Future  -     H. Pylori Breath Test - Breath, Lung; Future  -     Lactate Dehydrogenase; Future  -     Ferritin; Future  -     Procalcitonin; Future  -     Celiac Disease Antibody Screen  -     Saccharomyces Cerevisiae Antibodies, IgG & IgA  -     CBC & Differential  -     Comprehensive Metabolic Panel  -     H. Pylori Breath Test - Breath, Lung  -     Lactate Dehydrogenase  -     Ferritin  -     Procalcitonin  -     CBC Auto Differential    Acute nonintractable headache, unspecified headache type  -     SARS-CoV-2, KOFI ([facility]) - Swab, Nasopharynx; Future  -     Gastrointestinal Panel, PCR - Stool, Per Rectum; Future  -     Celiac Disease Antibody Screen; Future  -     Saccharomyces Cerevisiae Antibodies, IgG & IgA; Future  -     CBC & Differential; Future  -     Comprehensive Metabolic Panel; Future  -     H. Pylori Breath Test - Breath, Lung; Future  -     Lactate Dehydrogenase; Future  -     Ferritin; Future  -     Procalcitonin; Future  -      Celiac Disease Antibody Screen  -     Saccharomyces Cerevisiae Antibodies, IgG & IgA  -     CBC & Differential  -     Comprehensive Metabolic Panel  -     H. Pylori Breath Test - Breath, Lung  -     Lactate Dehydrogenase  -     Ferritin  -     Procalcitonin  -     CBC Auto Differential    Anxiety  -     propranolol (INDERAL) 10 MG tablet; Take 1 tablet by mouth 3 (Three) Times a Day As Needed (anxiety).  Patient to start Inderal 10 mg orally 3 times a day as needed for anxiety.    Patient's Body mass index is 17.91 kg/m². BMI is within normal parameters. No follow-up required..       Asif Medina  reports that he has been smoking cigarettes. He has a 10.00 pack-year smoking history. He has never used smokeless tobacco.. I have educated him on the risk of diseases from using tobacco products such as cancer, COPD and heart diease.     I advised him to quit and he is not willing to quit.    I spent 3  minutes counseling the patient.          Findings and plans discussed with patient who verbalizes understanding and agreement. Will followup with patient once results are in. Patient to followup at clinic PRN or in one week for further medical followup.    MD MARIO Gusman Dragon/Transcription Disclaimer:  Much of this encounter note is an electronic transcription/translation of spoken language to printed text.  The electronic translation of spoken language may permit erroneous, or at times, nonsensical words or phrases to be inadvertently transcribed.  Although I have reviewed the note for such errors, some may still exist.

## 2020-06-15 NOTE — PROGRESS NOTES
"Asif Medina     VITALS: Blood pressure 122/72, pulse 88, temperature 98.9 °F (37.2 °C), temperature source Oral, height 195.6 cm (77.01\"), weight 69.7 kg (153 lb 9.6 oz), SpO2 97 %.    Subjective  Chief Complaint:   Chief Complaint   Patient presents with   • Follow-up     nausea and diarrhea        History of Present Illness:  Patient is a 29 y.o.  male with a medical history significant for migraines who presents to clinic secondary to medical followup.  Patient had not been feeling well for 2 weeks.  He had been having diarrhea.  Stool studies had showed norovirus and EAEC.  Secondary to length of the infection, he was started on Cipro 500 mg orally twice a day.  Patient states that he feels so much better with the Cipro.  Diarrhea has improved to only one episode daily.  He states that he has been getting well.  He denies any fevers or chills.  He denies any nausea, vomiting, abdominal pain.  Stool is firming back up.  He does admit that this has aggravated his anal fistula.    No complaints about any of the medications.    The following portions of the patient's history were reviewed and updated as appropriate: allergies, current medications, past family history, past medical history, past social history, past surgical history and problem list.    Past Medical History  Past Medical History:   Diagnosis Date   • Anal fistula    • Anxiety        Review of Systems   Respiratory: Negative for shortness of breath and wheezing.    Cardiovascular: Negative for chest pain and palpitations.       Surgical History  Past Surgical History:   Procedure Laterality Date   • INCISION AND DRAINAGE OF WOUND     • RECTAL FISTULOTOMY N/A 6/5/2020    Procedure: RECTAL FISTULOTOMY;  Surgeon: Nixon Shah MD;  Location: [facility];  Service: General;  Laterality: N/A;   • WISDOM TOOTH EXTRACTION  10/30/2018       Family History  Family History   Problem Relation Age of Onset   • Hypertension Mother    • Kidney nephrosis " Mother    • Cancer Father    • Anxiety disorder Father    • Bipolar disorder Father    • Depression Father    • Alcohol abuse Father    • Alcohol abuse Paternal Uncle    • Heart disease Maternal Grandmother    • Diabetes Maternal Grandmother    • Hypertension Maternal Grandmother    • Heart disease Maternal Grandfather    • Diabetes Maternal Grandfather    • Hypertension Maternal Grandfather    • Heart disease Paternal Grandmother    • Diabetes Paternal Grandmother    • Hypertension Paternal Grandmother    • Heart disease Paternal Grandfather    • Diabetes Paternal Grandfather    • Hypertension Paternal Grandfather    • Cancer Paternal Grandfather    • Bipolar disorder Paternal Grandfather    • Crohn's disease Other        Social History  Social History     Socioeconomic History   • Marital status:      Spouse name: Not on file   • Number of children: Not on file   • Years of education: Not on file   • Highest education level: Not on file   Tobacco Use   • Smoking status: Current Every Day Smoker     Packs/day: 1.00     Years: 10.00     Pack years: 10.00     Types: Cigarettes   • Smokeless tobacco: Never Used   Substance and Sexual Activity   • Alcohol use: No   • Drug use: No   • Sexual activity: Defer       Objective  Physical Exam    Gen: Patient in NAD. Pleasant and answers appropriately. A&Ox3.    Skin: Warm and dry with normal turgor. No purpura, rashes, or unusual pigmentation noted. Hair is normal in appearance and distribution.    HEENT: NC/AT. No lesions noted. Conjunctiva clear, sclera nonicteric. PERRL. EOMI without nystagmus or strabismus. Fundi appear benign. No hemorrhages or exudates of eyes. Auditory canals are patent bilaterally without lesions. TMs intact,  nonerythematous, nonbulging without lesions. Nasal mucosa pink, nonerythematous, and nonedematous. Frontal and maxillary sinuses are nontender. O/P nonerythematous and moist without exudate.    Neck: Supple without lymph nodes palpated.  FROM.     Lungs: CTA B/L without rales, rhonchi, crackles, or wheezes.    Heart: RRR. S1 and S2 normal. No S3 or S4. No MRGT.    Abd: Soft, nontender,nondistended. (+)BSx4 quadrants, none hyperechoic or hypoechoic.  No HSM, masses, or bruits.    Extrem: No CCE. Radial pulses 2+/4 and equal B/L. FROMx4.     Neuro: No focal motor/sensory deficits.    Procedures    Assessment/Plan  Asif Medina is a 29 y.o. here for medical followup.  Diagnoses and all orders for this visit:    Gastroenteritis due to norovirus  Supportive care indicated, including increased fluids and rest. Patient to monitor. Patient to call if symptoms continue or worsen.     E coli enteritis  Patient to finish Cipro 500 mg orally twice a day.  He is going to call general surgery to discuss anal fistula.    Patient's Body mass index is 18.21 kg/m². BMI is within normal parameters. No follow-up required..       Asif Medina  reports that he has been smoking cigarettes. He has a 10.00 pack-year smoking history. He has never used smokeless tobacco.. I have educated him on the risk of diseases from using tobacco products such as cancer, COPD and heart diease.     I advised him to quit and he is not willing to quit.    I spent 3  minutes counseling the patient.          Findings and plans discussed with patient who verbalizes understanding and agreement. Will followup with patient once results are in. Patient to followup at clinic PRN or in two months for further medical followup.    MD MARIO Gusman Dragon/Transcription Disclaimer:  Much of this encounter note is an electronic transcription/translation of spoken language to printed text.  The electronic translation of spoken language may permit erroneous, or at times, nonsensical words or phrases to be inadvertently transcribed.  Although I have reviewed the note for such errors, some may still exist.

## 2020-06-24 ENCOUNTER — OFFICE VISIT (OUTPATIENT)
Dept: SURGERY | Facility: CLINIC | Age: 30
End: 2020-06-24

## 2020-06-24 VITALS — WEIGHT: 149 LBS | BODY MASS INDEX: 17.24 KG/M2 | HEIGHT: 78 IN

## 2020-06-24 DIAGNOSIS — L02.31 CUTANEOUS ABSCESS OF BUTTOCK: Primary | ICD-10-CM

## 2020-06-24 PROCEDURE — 99024 POSTOP FOLLOW-UP VISIT: CPT | Performed by: SURGERY

## 2020-06-24 NOTE — PROGRESS NOTES
Subjective   Asif Medina is a 29 y.o. male  is here today for follow-up.         Asif Medina is a 29 y.o. male here for follow up after excision of chronic buttock wound.  No evidence of fistula in ano.  His wound is healing well.           Assessment     There are no diagnoses linked to this encounter.  Asif Medina is a 29 y.o. male s/p chronic wound excision.  He will follow up PRN.

## 2020-07-21 ENCOUNTER — OFFICE VISIT (OUTPATIENT)
Dept: SURGERY | Facility: CLINIC | Age: 30
End: 2020-07-21

## 2020-07-21 VITALS — BODY MASS INDEX: 17.24 KG/M2 | HEIGHT: 78 IN | WEIGHT: 149 LBS

## 2020-07-21 DIAGNOSIS — L02.31 CUTANEOUS ABSCESS OF BUTTOCK: Primary | ICD-10-CM

## 2020-07-21 DIAGNOSIS — Z01.818 PREOP TESTING: Primary | ICD-10-CM

## 2020-07-21 PROCEDURE — 99024 POSTOP FOLLOW-UP VISIT: CPT | Performed by: SURGERY

## 2020-07-21 NOTE — PROGRESS NOTES
Subjective   Asif Medina is a 29 y.o. male is being seen for consultation today at the request of Kalyani Villagran MD    Asif Medina is a 29 y.o. male 29-year-old male with chronic right buttock abscess.  At last incision and drainage no internal opening consistent with fistula was identified but due to the chronic nature I suspect fistula is present.  From his previous incision and drainage there is granulation tissue with purulent and mucoid discharge on the right buttock which would be consistent with a right anterior fistula tract.      Past Medical History:   Diagnosis Date   • Anal fistula    • Anxiety        Family History   Problem Relation Age of Onset   • Hypertension Mother    • Kidney nephrosis Mother    • Cancer Father    • Anxiety disorder Father    • Bipolar disorder Father    • Depression Father    • Alcohol abuse Father    • Alcohol abuse Paternal Uncle    • Heart disease Maternal Grandmother    • Diabetes Maternal Grandmother    • Hypertension Maternal Grandmother    • Heart disease Maternal Grandfather    • Diabetes Maternal Grandfather    • Hypertension Maternal Grandfather    • Heart disease Paternal Grandmother    • Diabetes Paternal Grandmother    • Hypertension Paternal Grandmother    • Heart disease Paternal Grandfather    • Diabetes Paternal Grandfather    • Hypertension Paternal Grandfather    • Cancer Paternal Grandfather    • Bipolar disorder Paternal Grandfather    • Crohn's disease Other        Social History     Socioeconomic History   • Marital status:      Spouse name: Not on file   • Number of children: Not on file   • Years of education: Not on file   • Highest education level: Not on file   Tobacco Use   • Smoking status: Current Every Day Smoker     Packs/day: 1.00     Years: 10.00     Pack years: 10.00     Types: Cigarettes   • Smokeless tobacco: Never Used   Substance and Sexual Activity   • Alcohol use: No   • Drug use: No   • Sexual activity: Defer       Past  "Surgical History:   Procedure Laterality Date   • INCISION AND DRAINAGE OF WOUND     • RECTAL FISTULOTOMY N/A 6/5/2020    Procedure: RECTAL FISTULOTOMY;  Surgeon: Nixon Shah MD;  Location: [facility];  Service: General;  Laterality: N/A;   • WISDOM TOOTH EXTRACTION  10/30/2018       Review of Systems   Constitutional: Negative for activity change, appetite change, chills and fever.   HENT: Negative for sore throat and trouble swallowing.    Eyes: Negative for visual disturbance.   Respiratory: Negative for cough and shortness of breath.    Cardiovascular: Negative for chest pain and palpitations.   Gastrointestinal: Negative for abdominal distention, abdominal pain, blood in stool, constipation, diarrhea, nausea and vomiting.   Endocrine: Negative for cold intolerance and heat intolerance.   Genitourinary: Negative for dysuria.   Musculoskeletal: Negative for joint swelling.   Skin: Positive for wound. Negative for color change and rash.   Allergic/Immunologic: Negative for immunocompromised state.   Neurological: Negative for dizziness, seizures, weakness and headaches.   Hematological: Negative for adenopathy. Does not bruise/bleed easily.   Psychiatric/Behavioral: Negative for agitation and confusion.         Ht 198.1 cm (77.99\")   Wt 67.6 kg (149 lb)   BMI 17.22 kg/m²   Objective   Physical Exam   Constitutional: He is oriented to person, place, and time. He appears well-developed.   HENT:   Head: Normocephalic and atraumatic.   Mouth/Throat: Mucous membranes are normal.   Eyes: Pupils are equal, round, and reactive to light. Conjunctivae are normal.   Neck: Neck supple. No JVD present. No tracheal deviation present. No thyromegaly present.   Cardiovascular: Normal rate and regular rhythm. Exam reveals no gallop and no friction rub.   No murmur heard.  Pulmonary/Chest: Effort normal and breath sounds normal.   Abdominal: Soft. He exhibits no distension. There is no splenomegaly or hepatomegaly. There " is no tenderness. No hernia.   Musculoskeletal: Normal range of motion. He exhibits no deformity.   Neurological: He is alert and oriented to person, place, and time.   Skin: Skin is warm and dry.        Right buttock wound with granulation tissue but mucoid and purulent discharge   Psychiatric: He has a normal mood and affect.             Assessment   Asif was seen today for abscess of buttock.    Diagnoses and all orders for this visit:    Cutaneous abscess of buttock  -     Case Request; Standing  -     CBC and Differential; Future  -     Comprehensive metabolic panel; Future  -     Case Request    Other orders  -     Obtain informed consent  -     Provide NPO Instructions to Patient; Future  -     Chlorhexidine Skin Prep; Future      Asif Medina is a 29 y.o. male with chronic abscess of the right buttock concerning for possible fistula.  He understands risk and benefits of surgery next week will undergo anal examination under anesthesia with possible fistulotomy.    Patient's Body mass index is 17.22 kg/m². BMI is within normal parameters. No follow-up required..

## 2020-07-29 ENCOUNTER — APPOINTMENT (OUTPATIENT)
Dept: PREADMISSION TESTING | Facility: HOSPITAL | Age: 30
End: 2020-07-29

## 2020-07-29 ENCOUNTER — LAB (OUTPATIENT)
Dept: LAB | Facility: HOSPITAL | Age: 30
End: 2020-07-29

## 2020-07-29 DIAGNOSIS — L02.31 CUTANEOUS ABSCESS OF BUTTOCK: ICD-10-CM

## 2020-07-29 DIAGNOSIS — Z01.818 PREOP TESTING: ICD-10-CM

## 2020-07-29 LAB
ALBUMIN SERPL-MCNC: 5.19 G/DL (ref 3.5–5.2)
ALBUMIN/GLOB SERPL: 1.8 G/DL
ALP SERPL-CCNC: 59 U/L (ref 39–117)
ALT SERPL W P-5'-P-CCNC: 15 U/L (ref 1–41)
ANION GAP SERPL CALCULATED.3IONS-SCNC: 13.3 MMOL/L (ref 5–15)
AST SERPL-CCNC: 17 U/L (ref 1–40)
BASOPHILS # BLD AUTO: 0.1 10*3/MM3 (ref 0–0.2)
BASOPHILS NFR BLD AUTO: 1.5 % (ref 0–1.5)
BILIRUB SERPL-MCNC: 0.3 MG/DL (ref 0–1.2)
BUN SERPL-MCNC: 14 MG/DL (ref 6–20)
BUN/CREAT SERPL: 13.9 (ref 7–25)
CALCIUM SPEC-SCNC: 9.8 MG/DL (ref 8.6–10.5)
CHLORIDE SERPL-SCNC: 101 MMOL/L (ref 98–107)
CO2 SERPL-SCNC: 26.7 MMOL/L (ref 22–29)
CREAT SERPL-MCNC: 1.01 MG/DL (ref 0.76–1.27)
DEPRECATED RDW RBC AUTO: 42.3 FL (ref 37–54)
EOSINOPHIL # BLD AUTO: 0.1 10*3/MM3 (ref 0–0.4)
EOSINOPHIL NFR BLD AUTO: 1.5 % (ref 0.3–6.2)
ERYTHROCYTE [DISTWIDTH] IN BLOOD BY AUTOMATED COUNT: 12.2 % (ref 12.3–15.4)
GFR SERPL CREATININE-BSD FRML MDRD: 87 ML/MIN/1.73
GLOBULIN UR ELPH-MCNC: 2.9 GM/DL
GLUCOSE SERPL-MCNC: 92 MG/DL (ref 65–99)
HCT VFR BLD AUTO: 48.8 % (ref 37.5–51)
HGB BLD-MCNC: 16.6 G/DL (ref 13–17.7)
IMM GRANULOCYTES # BLD AUTO: 0.03 10*3/MM3 (ref 0–0.05)
IMM GRANULOCYTES NFR BLD AUTO: 0.4 % (ref 0–0.5)
LYMPHOCYTES # BLD AUTO: 1.79 10*3/MM3 (ref 0.7–3.1)
LYMPHOCYTES NFR BLD AUTO: 26.8 % (ref 19.6–45.3)
MCH RBC QN AUTO: 31.7 PG (ref 26.6–33)
MCHC RBC AUTO-ENTMCNC: 34 G/DL (ref 31.5–35.7)
MCV RBC AUTO: 93.3 FL (ref 79–97)
MONOCYTES # BLD AUTO: 0.59 10*3/MM3 (ref 0.1–0.9)
MONOCYTES NFR BLD AUTO: 8.8 % (ref 5–12)
NEUTROPHILS NFR BLD AUTO: 4.06 10*3/MM3 (ref 1.7–7)
NEUTROPHILS NFR BLD AUTO: 61 % (ref 42.7–76)
NRBC BLD AUTO-RTO: 0 /100 WBC (ref 0–0.2)
PLATELET # BLD AUTO: 176 10*3/MM3 (ref 140–450)
PMV BLD AUTO: 11.2 FL (ref 6–12)
POTASSIUM SERPL-SCNC: 4 MMOL/L (ref 3.5–5.2)
PROT SERPL-MCNC: 8.1 G/DL (ref 6–8.5)
RBC # BLD AUTO: 5.23 10*6/MM3 (ref 4.14–5.8)
SODIUM SERPL-SCNC: 141 MMOL/L (ref 136–145)
WBC # BLD AUTO: 6.67 10*3/MM3 (ref 3.4–10.8)

## 2020-07-29 PROCEDURE — U0004 COV-19 TEST NON-CDC HGH THRU: HCPCS

## 2020-07-29 PROCEDURE — 80053 COMPREHEN METABOLIC PANEL: CPT | Performed by: SURGERY

## 2020-07-29 PROCEDURE — U0002 COVID-19 LAB TEST NON-CDC: HCPCS

## 2020-07-29 PROCEDURE — 36415 COLL VENOUS BLD VENIPUNCTURE: CPT

## 2020-07-29 PROCEDURE — 85025 COMPLETE CBC W/AUTO DIFF WBC: CPT | Performed by: SURGERY

## 2020-07-29 PROCEDURE — C9803 HOPD COVID-19 SPEC COLLECT: HCPCS

## 2020-07-30 LAB
REF LAB TEST METHOD: NORMAL
SARS-COV-2 RNA RESP QL NAA+PROBE: NOT DETECTED

## 2020-07-31 ENCOUNTER — ANESTHESIA (OUTPATIENT)
Dept: PERIOP | Facility: HOSPITAL | Age: 30
End: 2020-07-31

## 2020-07-31 ENCOUNTER — HOSPITAL ENCOUNTER (OUTPATIENT)
Facility: HOSPITAL | Age: 30
Setting detail: HOSPITAL OUTPATIENT SURGERY
Discharge: HOME | End: 2020-07-31
Attending: SURGERY | Admitting: SURGERY

## 2020-07-31 ENCOUNTER — ANESTHESIA EVENT (OUTPATIENT)
Dept: PERIOP | Facility: HOSPITAL | Age: 30
End: 2020-07-31

## 2020-07-31 VITALS
SYSTOLIC BLOOD PRESSURE: 118 MMHG | HEIGHT: 78 IN | DIASTOLIC BLOOD PRESSURE: 74 MMHG | WEIGHT: 150 LBS | BODY MASS INDEX: 17.36 KG/M2 | HEART RATE: 75 BPM | OXYGEN SATURATION: 98 % | TEMPERATURE: 97.6 F | RESPIRATION RATE: 18 BRPM

## 2020-07-31 DIAGNOSIS — L02.31 CUTANEOUS ABSCESS OF BUTTOCK: ICD-10-CM

## 2020-07-31 DIAGNOSIS — K60.3 ANAL FISTULA: Primary | ICD-10-CM

## 2020-07-31 PROCEDURE — 25010000002 PROPOFOL 10 MG/ML EMULSION: Performed by: NURSE ANESTHETIST, CERTIFIED REGISTERED

## 2020-07-31 PROCEDURE — 46275 REMOVE ANAL FIST INTER: CPT | Performed by: SURGERY

## 2020-07-31 PROCEDURE — 25010000002 MIDAZOLAM PER 1 MG: Performed by: NURSE ANESTHETIST, CERTIFIED REGISTERED

## 2020-07-31 PROCEDURE — 25010000003 BUPIVACAINE LIPOSOME 1.3 % SUSPENSION: Performed by: SURGERY

## 2020-07-31 PROCEDURE — C9290 INJ, BUPIVACAINE LIPOSOME: HCPCS | Performed by: SURGERY

## 2020-07-31 PROCEDURE — 25010000002 KETOROLAC TROMETHAMINE PER 15 MG: Performed by: NURSE ANESTHETIST, CERTIFIED REGISTERED

## 2020-07-31 PROCEDURE — 25010000002 ONDANSETRON PER 1 MG: Performed by: NURSE ANESTHETIST, CERTIFIED REGISTERED

## 2020-07-31 PROCEDURE — 94799 UNLISTED PULMONARY SVC/PX: CPT

## 2020-07-31 RX ORDER — PROPOFOL 10 MG/ML
VIAL (ML) INTRAVENOUS AS NEEDED
Status: DISCONTINUED | OUTPATIENT
Start: 2020-07-31 | End: 2020-07-31 | Stop reason: SURG

## 2020-07-31 RX ORDER — KETOROLAC TROMETHAMINE 30 MG/ML
INJECTION, SOLUTION INTRAMUSCULAR; INTRAVENOUS AS NEEDED
Status: DISCONTINUED | OUTPATIENT
Start: 2020-07-31 | End: 2020-07-31 | Stop reason: SURG

## 2020-07-31 RX ORDER — SODIUM CHLORIDE 0.9 % (FLUSH) 0.9 %
10 SYRINGE (ML) INJECTION EVERY 12 HOURS SCHEDULED
Status: DISCONTINUED | OUTPATIENT
Start: 2020-07-31 | End: 2020-07-31 | Stop reason: HOSPADM

## 2020-07-31 RX ORDER — SODIUM CHLORIDE 0.9 % (FLUSH) 0.9 %
10 SYRINGE (ML) INJECTION AS NEEDED
Status: DISCONTINUED | OUTPATIENT
Start: 2020-07-31 | End: 2020-07-31 | Stop reason: HOSPADM

## 2020-07-31 RX ORDER — FAMOTIDINE 10 MG/ML
INJECTION, SOLUTION INTRAVENOUS AS NEEDED
Status: DISCONTINUED | OUTPATIENT
Start: 2020-07-31 | End: 2020-07-31 | Stop reason: SURG

## 2020-07-31 RX ORDER — ONDANSETRON 2 MG/ML
INJECTION INTRAMUSCULAR; INTRAVENOUS AS NEEDED
Status: DISCONTINUED | OUTPATIENT
Start: 2020-07-31 | End: 2020-07-31 | Stop reason: SURG

## 2020-07-31 RX ORDER — ACETAMINOPHEN 325 MG/1
650 TABLET ORAL EVERY 4 HOURS PRN
Qty: 30 TABLET | Refills: 0 | Status: SHIPPED | OUTPATIENT
Start: 2020-07-31 | End: 2021-10-05

## 2020-07-31 RX ORDER — MIDAZOLAM HYDROCHLORIDE 1 MG/ML
1 INJECTION INTRAMUSCULAR; INTRAVENOUS
Status: DISCONTINUED | OUTPATIENT
Start: 2020-07-31 | End: 2020-07-31 | Stop reason: SDUPTHER

## 2020-07-31 RX ORDER — OXYCODONE HYDROCHLORIDE AND ACETAMINOPHEN 5; 325 MG/1; MG/1
1 TABLET ORAL ONCE AS NEEDED
Status: DISCONTINUED | OUTPATIENT
Start: 2020-07-31 | End: 2020-07-31 | Stop reason: HOSPADM

## 2020-07-31 RX ORDER — IPRATROPIUM BROMIDE AND ALBUTEROL SULFATE 2.5; .5 MG/3ML; MG/3ML
3 SOLUTION RESPIRATORY (INHALATION) ONCE AS NEEDED
Status: DISCONTINUED | OUTPATIENT
Start: 2020-07-31 | End: 2020-07-31 | Stop reason: HOSPADM

## 2020-07-31 RX ORDER — MAGNESIUM HYDROXIDE 1200 MG/15ML
LIQUID ORAL AS NEEDED
Status: DISCONTINUED | OUTPATIENT
Start: 2020-07-31 | End: 2020-07-31 | Stop reason: HOSPADM

## 2020-07-31 RX ORDER — FENTANYL CITRATE 50 UG/ML
50 INJECTION, SOLUTION INTRAMUSCULAR; INTRAVENOUS
Status: DISCONTINUED | OUTPATIENT
Start: 2020-07-31 | End: 2020-07-31 | Stop reason: HOSPADM

## 2020-07-31 RX ORDER — MIDAZOLAM HYDROCHLORIDE 1 MG/ML
INJECTION INTRAMUSCULAR; INTRAVENOUS AS NEEDED
Status: DISCONTINUED | OUTPATIENT
Start: 2020-07-31 | End: 2020-07-31 | Stop reason: SURG

## 2020-07-31 RX ORDER — MIDAZOLAM HYDROCHLORIDE 1 MG/ML
1 INJECTION INTRAMUSCULAR; INTRAVENOUS
Status: DISCONTINUED | OUTPATIENT
Start: 2020-07-31 | End: 2020-07-31 | Stop reason: HOSPADM

## 2020-07-31 RX ORDER — TRAMADOL HYDROCHLORIDE 50 MG/1
50 TABLET ORAL EVERY 6 HOURS PRN
Qty: 8 TABLET | Refills: 0 | Status: SHIPPED | OUTPATIENT
Start: 2020-07-31 | End: 2020-08-22

## 2020-07-31 RX ORDER — MEPERIDINE HYDROCHLORIDE 25 MG/ML
12.5 INJECTION INTRAMUSCULAR; INTRAVENOUS; SUBCUTANEOUS
Status: DISCONTINUED | OUTPATIENT
Start: 2020-07-31 | End: 2020-07-31 | Stop reason: HOSPADM

## 2020-07-31 RX ORDER — SODIUM CHLORIDE, SODIUM LACTATE, POTASSIUM CHLORIDE, CALCIUM CHLORIDE 600; 310; 30; 20 MG/100ML; MG/100ML; MG/100ML; MG/100ML
125 INJECTION, SOLUTION INTRAVENOUS CONTINUOUS
Status: DISCONTINUED | OUTPATIENT
Start: 2020-07-31 | End: 2020-07-31 | Stop reason: HOSPADM

## 2020-07-31 RX ORDER — IBUPROFEN 600 MG/1
600 TABLET ORAL EVERY 6 HOURS PRN
Qty: 30 TABLET | Refills: 0 | Status: SHIPPED | OUTPATIENT
Start: 2020-07-31

## 2020-07-31 RX ORDER — ONDANSETRON 2 MG/ML
4 INJECTION INTRAMUSCULAR; INTRAVENOUS AS NEEDED
Status: DISCONTINUED | OUTPATIENT
Start: 2020-07-31 | End: 2020-07-31 | Stop reason: HOSPADM

## 2020-07-31 RX ORDER — SODIUM CHLORIDE, SODIUM LACTATE, POTASSIUM CHLORIDE, CALCIUM CHLORIDE 600; 310; 30; 20 MG/100ML; MG/100ML; MG/100ML; MG/100ML
125 INJECTION, SOLUTION INTRAVENOUS CONTINUOUS
Status: DISCONTINUED | OUTPATIENT
Start: 2020-07-31 | End: 2020-07-31 | Stop reason: SDUPTHER

## 2020-07-31 RX ADMIN — KETOROLAC TROMETHAMINE 30 MG: 30 INJECTION, SOLUTION INTRAMUSCULAR; INTRAVENOUS at 07:37

## 2020-07-31 RX ADMIN — ONDANSETRON 4 MG: 2 INJECTION INTRAMUSCULAR; INTRAVENOUS at 07:37

## 2020-07-31 RX ADMIN — SODIUM CHLORIDE, POTASSIUM CHLORIDE, SODIUM LACTATE AND CALCIUM CHLORIDE 125 ML/HR: 600; 310; 30; 20 INJECTION, SOLUTION INTRAVENOUS at 07:21

## 2020-07-31 RX ADMIN — AMPICILLIN AND SULBACTAM 3 G: 1; .5 INJECTION, POWDER, FOR SOLUTION INTRAMUSCULAR; INTRAVENOUS at 07:41

## 2020-07-31 RX ADMIN — FAMOTIDINE 20 MG: 10 INJECTION INTRAVENOUS at 07:37

## 2020-07-31 RX ADMIN — PROPOFOL 200 MG: 10 INJECTION, EMULSION INTRAVENOUS at 07:41

## 2020-07-31 RX ADMIN — MIDAZOLAM HYDROCHLORIDE 2 MG: 1 INJECTION, SOLUTION INTRAMUSCULAR; INTRAVENOUS at 07:37

## 2020-07-31 RX ADMIN — SODIUM CHLORIDE, POTASSIUM CHLORIDE, SODIUM LACTATE AND CALCIUM CHLORIDE: 600; 310; 30; 20 INJECTION, SOLUTION INTRAVENOUS at 07:37

## 2020-07-31 NOTE — ANESTHESIA POSTPROCEDURE EVALUATION
Patient: Asif Medina    Procedure Summary     Date:  07/31/20 Room / Location:  [facility] OR  /  [facility] OR    Anesthesia Start:  0738 Anesthesia Stop:  0807    Procedure:  RECTAL FISTULOTOMY (N/A Rectum) Diagnosis:       Cutaneous abscess of buttock      (Cutaneous abscess of buttock [L02.31])    Surgeon:  Nixon Shah MD Provider:  Peter Kay DO    Anesthesia Type:  general ASA Status:  2          Anesthesia Type: general    Vitals  Vitals Value Taken Time   /74 7/31/2020  8:35 AM   Temp 97.4 °F (36.3 °C) 7/31/2020  8:07 AM   Pulse 67 7/31/2020  8:35 AM   Resp 16 7/31/2020  8:35 AM   SpO2 98 % 7/31/2020  8:35 AM           Post Anesthesia Care and Evaluation    Patient location during evaluation: PHASE II  Patient participation: complete - patient participated  Level of consciousness: awake and alert  Pain score: 1  Pain management: adequate  Airway patency: patent  Anesthetic complications: No anesthetic complications  PONV Status: controlled  Cardiovascular status: acceptable  Respiratory status: acceptable  Hydration status: acceptable

## 2020-07-31 NOTE — ANESTHESIA PROCEDURE NOTES
Airway  Urgency: elective    Date/Time: 7/31/2020 7:41 AM  Airway not difficult    General Information and Staff    Patient location during procedure: OR  Anesthesiologist: Peter Kay DO  CRNA: Latonya Roberts CRNA    Indications and Patient Condition  Indications for airway management: airway protection    Preoxygenated: yes  Mask difficulty assessment: 0 - not attempted    Final Airway Details  Final airway type: supraglottic airway      Successful airway: classic and unique  Size 4    Number of attempts at approach: 1  Assessment: lips, teeth, and gum same as pre-op

## 2020-07-31 NOTE — ANESTHESIA PREPROCEDURE EVALUATION
Anesthesia Evaluation     no history of anesthetic complications:  NPO Solid Status: > 8 hours  NPO Liquid Status: > 8 hours           Airway   Mallampati: II  TM distance: >3 FB  Neck ROM: full  No difficulty expected  Dental - normal exam     Pulmonary - normal exam   Cardiovascular - normal exam        Neuro/Psych  (+) psychiatric history,     GI/Hepatic/Renal/Endo      Musculoskeletal     Abdominal  - normal exam    Bowel sounds: normal.   Substance History      OB/GYN          Other                          Anesthesia Plan    ASA 2     general     intravenous induction     Anesthetic plan, all risks, benefits, and alternatives have been provided, discussed and informed consent has been obtained with: patient.

## 2020-08-12 ENCOUNTER — OFFICE VISIT (OUTPATIENT)
Dept: FAMILY MEDICINE CLINIC | Facility: CLINIC | Age: 30
End: 2020-08-12

## 2020-08-12 ENCOUNTER — TELEPHONE (OUTPATIENT)
Dept: SURGERY | Facility: CLINIC | Age: 30
End: 2020-08-12

## 2020-08-12 VITALS
SYSTOLIC BLOOD PRESSURE: 134 MMHG | RESPIRATION RATE: 16 BRPM | HEART RATE: 81 BPM | WEIGHT: 152.4 LBS | BODY MASS INDEX: 17.63 KG/M2 | TEMPERATURE: 97.3 F | HEIGHT: 78 IN | OXYGEN SATURATION: 99 % | DIASTOLIC BLOOD PRESSURE: 80 MMHG

## 2020-08-12 DIAGNOSIS — G43.709 CHRONIC MIGRAINE WITHOUT AURA WITHOUT STATUS MIGRAINOSUS, NOT INTRACTABLE: ICD-10-CM

## 2020-08-12 DIAGNOSIS — F41.9 ANXIETY: ICD-10-CM

## 2020-08-12 DIAGNOSIS — R19.7 DIARRHEA, UNSPECIFIED TYPE: Primary | ICD-10-CM

## 2020-08-12 PROCEDURE — 99214 OFFICE O/P EST MOD 30 MIN: CPT | Performed by: FAMILY MEDICINE

## 2020-08-12 RX ORDER — PROPRANOLOL HYDROCHLORIDE 40 MG/1
40 TABLET ORAL 3 TIMES DAILY
Qty: 90 TABLET | Refills: 2 | Status: SHIPPED | OUTPATIENT
Start: 2020-08-12 | End: 2021-10-05

## 2020-08-12 NOTE — TELEPHONE ENCOUNTER
Left rescheduled appointment info with spouse. She said they would be here for the new appointment 8/26 @ 9:30

## 2020-08-12 NOTE — PATIENT INSTRUCTIONS
Call if complications.   Watch the BP with propranolol. You can also split the tablets.  Strong probiotics at the pharmacy.

## 2020-08-23 NOTE — PROGRESS NOTES
"Asif Medina     VITALS: Blood pressure 134/80, pulse 81, temperature 97.3 °F (36.3 °C), temperature source Temporal, resp. rate 16, height 198.1 cm (77.99\"), weight 69.1 kg (152 lb 6.4 oz), SpO2 99 %.    Subjective  Chief Complaint:   Chief Complaint   Patient presents with   • Diarrhea     2 mos f/u. No changes in BM. 7/31/202 Anal fistula/abscess surgery.        History of Present Illness:  Patient is a 29 y.o.  male with a medical history significant for migraines who presents to clinic secondary to medical followup.  He is recently status post anal fistula surgery.  He states that he continues to have occasional diarrhea.  This has been about 2 and half months since he has been having the diarrhea.  He states that it is really irritating the results of the surgery.  Patient originally back in May 2020 was diagnosed with norovirus and EAEC.  He had been started on Cipro 500 mg orally twice a day which had helped him.  He states that his stools are almost all better with the exception of occasional diarrhea.  He would not complain, but after the surgery, everything posteriorly is very tender and irritable.  Patient also has migraines and would like to start on a medication.  He states that he probably gets about 3-4 migraines a week.  Migraines are diffuse and starts on the top of his head and spreads all over.  He describes the migraine as achy, throbbing, and occasionally sharp.  He states that occasionally his vision is affected, but he does not get any numbness or tingling in his hands.  Rest makes it better.  Activity makes it worse.  He has failed Topamax in the past.  Patient also reports anxiety.  He currently is not on any medications for it.  He has been having decreased sleep but appropriate appetite.  He is able to get out of bed and do daily activities.  He is not able to make goals.  Positive anhedonia.  Positive energy.  He is able to concentrate and have good memory ability.  No anger or " frustration.  He denies any auditory or visual hallucinations.  He denies any suicidal or homicidal ideations.    No complaints about any of the medications.    The following portions of the patient's history were reviewed and updated as appropriate: allergies, current medications, past family history, past medical history, past social history, past surgical history and problem list.    Past Medical History  Past Medical History:   Diagnosis Date   • Anal fistula    • Anxiety        Review of Systems   Respiratory: Negative for shortness of breath and wheezing.    Cardiovascular: Negative for chest pain and palpitations.       Surgical History  Past Surgical History:   Procedure Laterality Date   • INCISION AND DRAINAGE OF WOUND     • RECTAL FISTULOTOMY N/A 6/5/2020    Procedure: RECTAL FISTULOTOMY;  Surgeon: Nixon Shah MD;  Location: [facility];  Service: General;  Laterality: N/A;   • RECTAL FISTULOTOMY N/A 7/31/2020    Procedure: RECTAL FISTULOTOMY;  Surgeon: Nixon Shah MD;  Location: [facility];  Service: General;  Laterality: N/A;   • WISDOM TOOTH EXTRACTION  10/30/2018       Family History  Family History   Problem Relation Age of Onset   • Hypertension Mother    • Kidney nephrosis Mother    • Cancer Father    • Anxiety disorder Father    • Bipolar disorder Father    • Depression Father    • Alcohol abuse Father    • Alcohol abuse Paternal Uncle    • Heart disease Maternal Grandmother    • Diabetes Maternal Grandmother    • Hypertension Maternal Grandmother    • Heart disease Maternal Grandfather    • Diabetes Maternal Grandfather    • Hypertension Maternal Grandfather    • Heart disease Paternal Grandmother    • Diabetes Paternal Grandmother    • Hypertension Paternal Grandmother    • Heart disease Paternal Grandfather    • Diabetes Paternal Grandfather    • Hypertension Paternal Grandfather    • Cancer Paternal Grandfather    • Bipolar disorder Paternal Grandfather    • Crohn's disease  Other        Social History  Social History     Socioeconomic History   • Marital status:      Spouse name: Not on file   • Number of children: Not on file   • Years of education: Not on file   • Highest education level: Not on file   Tobacco Use   • Smoking status: Current Every Day Smoker     Packs/day: 1.00     Years: 10.00     Pack years: 10.00     Types: Cigarettes   • Smokeless tobacco: Never Used   Substance and Sexual Activity   • Alcohol use: No   • Drug use: No   • Sexual activity: Defer       Objective  Physical Exam    Gen: Patient in NAD. Pleasant and answers appropriately. A&Ox3.  Anxious.    Skin: Warm and dry with normal turgor. No purpura, rashes, or unusual pigmentation noted. Hair is normal in appearance and distribution.    HEENT: NC/AT. No lesions noted. Conjunctiva clear, sclera nonicteric. PERRL. EOMI without nystagmus or strabismus. Fundi appear benign. No hemorrhages or exudates of eyes. Auditory canals are patent bilaterally without lesions. TMs intact,  nonerythematous,bulging without lesions. Frontal and maxillary sinuses are nontender. O/P nonerythematous and moist without exudate.    Neck: Supple without lymph nodes palpated. FROM.     Lungs: CTA B/L without rales, rhonchi, crackles, or wheezes.    Heart: RRR. S1 and S2 normal. No S3 or S4. No MRGT.    Abd: Soft, nontender,nondistended. (+)BSx4 quadrants.     Buttock: Incision site healing well without any erythema or edema.    Extrem: No CCE. Radial pulses 2+/4 and equal B/L. FROMx4. No bone, joint, or muscle tenderness noted.    Neuro: No focal motor/sensory deficits.    Procedures    Assessment/Plan  Asif Medina is a 29 y.o. here for medical followup.  Diagnoses and all orders for this visit:    Diarrhea, unspecified type  -     Saccharomyces boulardii 500 MG pack; Take 500 mg by mouth Daily.  Will start patient on probiotics.  May need stool testing again. ?  May need to check for C. Difficile.    Chronic migraine without  aura without status migrainosus, not intractable  -     propranolol (INDERAL) 40 MG tablet; Take 1 tablet by mouth 3 (Three) Times a Day.  Failed Topamax in the past.  Try propranolol 40 mg orally 3 times a day.    Anxiety  -     propranolol (INDERAL) 40 MG tablet; Take 1 tablet by mouth 3 (Three) Times a Day.  Try propranolol 40 mg orally 3 times a day.    Patient's Body mass index is 17.62 kg/m². BMI is within normal parameters. Recommendations include: Continue to monitor.       Asif Medina  reports that he has been smoking cigarettes. He has a 10.00 pack-year smoking history. He has never used smokeless tobacco.. I have educated him on the risk of diseases from using tobacco products such as cancer, COPD and heart diease.     I advised him to quit and he is not willing to quit.    I spent 3  minutes counseling the patient.    Findings and plans discussed with patient who verbalizes understanding and agreement. Will followup with patient once results are in. Patient to followup at clinic PRN or in 3 months for further medical followup.    MD MARIO Gusman Dragon/Transcription Disclaimer:  Much of this encounter note is an electronic transcription/translation of spoken language to printed text.  The electronic translation of spoken language may permit erroneous, or at times, nonsensical words or phrases to be inadvertently transcribed.  Although I have reviewed the note for such errors, some may still exist.

## 2020-08-26 ENCOUNTER — OFFICE VISIT (OUTPATIENT)
Dept: SURGERY | Facility: CLINIC | Age: 30
End: 2020-08-26

## 2020-08-26 VITALS — BODY MASS INDEX: 17.59 KG/M2 | WEIGHT: 152 LBS | HEIGHT: 78 IN

## 2020-08-26 DIAGNOSIS — K60.3 ANAL FISTULA: ICD-10-CM

## 2020-08-26 DIAGNOSIS — L02.31 CUTANEOUS ABSCESS OF BUTTOCK: Primary | ICD-10-CM

## 2020-08-26 PROCEDURE — 99024 POSTOP FOLLOW-UP VISIT: CPT | Performed by: SURGERY

## 2020-08-26 NOTE — PROGRESS NOTES
Subjective   Asif Medina is a 29 y.o. male  is here today for follow-up.         Asif Medina is a 29 y.o. male here for follow up after fistulotomy.  The patient is doing well and his fistulotomy tract is granulating and epithelializing nicely.  No complaints reported.  No evidence of recurrence or complication        Assessment     Asif was seen today for post-op.    Diagnoses and all orders for this visit:    Cutaneous abscess of buttock    Anal fistula      Asif Medina is a 29 y.o. male doing well after anal examination under anesthesia with fistulotomy.  The patient has evidence of excellent healing with no complication or issues with fecal continence.  Follow-up as needed.

## 2020-09-11 ENCOUNTER — TELEPHONE (OUTPATIENT)
Dept: FAMILY MEDICINE CLINIC | Facility: CLINIC | Age: 30
End: 2020-09-11

## 2020-09-11 DIAGNOSIS — K60.3 ANAL FISTULA: Primary | ICD-10-CM

## 2020-09-11 NOTE — TELEPHONE ENCOUNTER
PATIENT STATES HE WOULD LIKE A REFERRAL TO [facility]. STATES HE HAS DISCUSSED WITH YOU IN THE PAST.

## 2020-12-28 ENCOUNTER — TELEPHONE (OUTPATIENT)
Dept: FAMILY MEDICINE CLINIC | Facility: CLINIC | Age: 30
End: 2020-12-28

## 2020-12-28 NOTE — TELEPHONE ENCOUNTER
PATIENT CALLED AND STATED THAT IF FISTULA REMOVAL WAS NOT SUCCESSFUL (AND IT WAS NOT) THAT DR EVANS SAID SHE WOULD SET HIM UP WITH SOME SCANS.     PLEASE CALL [phone]

## 2020-12-29 NOTE — TELEPHONE ENCOUNTER
Spoke with patient.  Yes, he did see Dr Otero and as he was the one that did the surgery.  He did have a follow up scheduled but has been unable to go yet since they have a new .

## 2020-12-30 NOTE — TELEPHONE ENCOUNTER
He needs to follow up with him for a reassessment. I cannot order scans without seeing him. Also, if something needs to be done, it would be best that he gets the imaging done with [facility].

## 2020-12-30 NOTE — TELEPHONE ENCOUNTER
He needs to follow up with him for a reassessment. I cannot order scans without seeing him. Also, if something needs to be done, it would be best that he gets the imaging done with [facility].      Patient notified & verbalized understanding.

## 2021-03-18 ENCOUNTER — BULK ORDERING (OUTPATIENT)
Dept: CASE MANAGEMENT | Facility: OTHER | Age: 31
End: 2021-03-18

## 2021-03-18 DIAGNOSIS — Z23 IMMUNIZATION DUE: ICD-10-CM

## 2021-10-05 ENCOUNTER — OFFICE VISIT (OUTPATIENT)
Dept: FAMILY MEDICINE CLINIC | Facility: CLINIC | Age: 31
End: 2021-10-05

## 2021-10-05 VITALS
SYSTOLIC BLOOD PRESSURE: 110 MMHG | OXYGEN SATURATION: 98 % | BODY MASS INDEX: 17.93 KG/M2 | DIASTOLIC BLOOD PRESSURE: 70 MMHG | HEIGHT: 78 IN | WEIGHT: 155 LBS | TEMPERATURE: 97.1 F | HEART RATE: 69 BPM

## 2021-10-05 DIAGNOSIS — Z23 NEED FOR DIPHTHERIA, TETANUS, ACELLULAR PERTUSSIS AND HAEMOPHILUS INFLUENZAE VACCINE: ICD-10-CM

## 2021-10-05 DIAGNOSIS — Z23 NEED FOR INFLUENZA VACCINATION: ICD-10-CM

## 2021-10-05 DIAGNOSIS — Z11.59 ENCOUNTER FOR HEPATITIS C SCREENING TEST FOR LOW RISK PATIENT: ICD-10-CM

## 2021-10-05 DIAGNOSIS — G43.709 CHRONIC MIGRAINE WITHOUT AURA WITHOUT STATUS MIGRAINOSUS, NOT INTRACTABLE: ICD-10-CM

## 2021-10-05 DIAGNOSIS — F41.9 ANXIETY: Primary | ICD-10-CM

## 2021-10-05 PROCEDURE — 85025 COMPLETE CBC W/AUTO DIFF WBC: CPT | Performed by: FAMILY MEDICINE

## 2021-10-05 PROCEDURE — 80074 ACUTE HEPATITIS PANEL: CPT | Performed by: FAMILY MEDICINE

## 2021-10-05 PROCEDURE — 99213 OFFICE O/P EST LOW 20 MIN: CPT | Performed by: FAMILY MEDICINE

## 2021-10-05 PROCEDURE — 90472 IMMUNIZATION ADMIN EACH ADD: CPT | Performed by: FAMILY MEDICINE

## 2021-10-05 PROCEDURE — 36415 COLL VENOUS BLD VENIPUNCTURE: CPT | Performed by: FAMILY MEDICINE

## 2021-10-05 PROCEDURE — 90715 TDAP VACCINE 7 YRS/> IM: CPT | Performed by: FAMILY MEDICINE

## 2021-10-05 PROCEDURE — 90471 IMMUNIZATION ADMIN: CPT | Performed by: FAMILY MEDICINE

## 2021-10-05 PROCEDURE — 80053 COMPREHEN METABOLIC PANEL: CPT | Performed by: FAMILY MEDICINE

## 2021-10-05 PROCEDURE — 90686 IIV4 VACC NO PRSV 0.5 ML IM: CPT | Performed by: FAMILY MEDICINE

## 2021-10-05 RX ORDER — CITALOPRAM 20 MG/1
20 TABLET ORAL DAILY
Qty: 30 TABLET | Refills: 2 | Status: SHIPPED | OUTPATIENT
Start: 2021-10-05 | End: 2021-10-13

## 2021-10-05 NOTE — PROGRESS NOTES
"Chief Complaint  Anxiety    Subjective          Asif Medina presents to [facility]  Anxiety  Presents for initial visit. Onset was 1 to 5 years ago. The problem has been waxing and waning. Symptoms include nervous/anxious behavior. Symptoms occur most days. The severity of symptoms is moderate. The symptoms are aggravated by social activities and work stress. The quality of sleep is fair. Nighttime awakenings: occasional.     His past medical history is significant for anxiety/panic attacks. Treatments tried: betablocker. The treatment provided mild relief. Compliance with prior treatments has been good. Compliance with medications: He was previously seen and has tried propranolol and it did not work well with him. He would like to try a different medications for the anxiety.    Headache   This is a chronic problem. The current episode started more than 1 year ago. The problem occurs daily. The problem has been waxing and waning. The quality of the pain is described as aching. The pain is mild. Associated symptoms comments: States he has a knot on the back of his head that gets bigger at times and causes what feels like \" a trickling sensation\" in the back of his head. He has a chronic headache but the knot in the head is new.      . The symptoms are aggravated by unknown.       Objective   Vital Signs:   /70 (BP Location: Right arm, Patient Position: Sitting, Cuff Size: Adult)   Pulse 69   Temp 97.1 °F (36.2 °C)   Ht 198.1 cm (78\")   Wt 70.3 kg (155 lb)   SpO2 98%   BMI 17.91 kg/m²     Physical Exam  Constitutional:       General: He is not in acute distress.     Appearance: Normal appearance. He is well-developed and well-groomed. He is not ill-appearing, toxic-appearing or diaphoretic.   HENT:      Head: Normocephalic.      Nose: Nose normal. No congestion or rhinorrhea.      Mouth/Throat:      Mouth: Mucous membranes are moist.      Pharynx: Oropharynx is clear. No " oropharyngeal exudate or posterior oropharyngeal erythema.   Eyes:      General: Lids are normal.         Right eye: No discharge.         Left eye: No discharge.      Extraocular Movements: Extraocular movements intact.      Pupils: Pupils are equal, round, and reactive to light.   Neck:      Vascular: No carotid bruit.   Cardiovascular:      Rate and Rhythm: Normal rate and regular rhythm.      Pulses: Normal pulses.      Heart sounds: Normal heart sounds. No murmur heard.   No friction rub. No gallop.    Pulmonary:      Effort: Pulmonary effort is normal. No respiratory distress.      Breath sounds: Normal breath sounds. No stridor. No wheezing, rhonchi or rales.   Chest:      Chest wall: No tenderness.   Abdominal:      General: Bowel sounds are normal. There is no distension.      Palpations: Abdomen is soft. There is no mass.      Tenderness: There is no abdominal tenderness. There is no right CVA tenderness, left CVA tenderness, guarding or rebound.      Hernia: No hernia is present.   Musculoskeletal:         General: No swelling or tenderness. Normal range of motion.      Cervical back: Normal range of motion and neck supple. No rigidity or tenderness.      Right lower leg: No edema.      Left lower leg: No edema.   Lymphadenopathy:      Cervical: No cervical adenopathy.   Skin:     General: Skin is warm.      Capillary Refill: Capillary refill takes less than 2 seconds.      Coloration: Skin is not jaundiced.      Findings: No bruising, erythema or rash.   Neurological:      General: No focal deficit present.      Mental Status: He is alert and oriented to person, place, and time.      Motor: Motor function is intact. No weakness.      Coordination: Coordination is intact.      Gait: Gait is intact. Gait normal.   Psychiatric:         Attention and Perception: Attention normal.         Mood and Affect: Mood normal.         Speech: Speech normal.         Behavior: Behavior normal.         Cognition and  Memory: Cognition normal.         Judgment: Judgment normal.        Social History     Tobacco Use   • Smoking status: Current Every Day Smoker     Packs/day: 1.00     Years: 10.00     Pack years: 10.00     Types: Cigarettes   • Smokeless tobacco: Never Used   Substance Use Topics   • Alcohol use: No      Past Medical History:   • Anal fistula   • Anxiety      Current Outpatient Medications on File Prior to Visit   Medication Sig   • ibuprofen (ADVIL,MOTRIN) 600 MG tablet Take 1 tablet by mouth Every 6 (Six) Hours As Needed for Mild Pain .   • [DISCONTINUED] acetaminophen (TYLENOL) 325 MG tablet Take 2 tablets by mouth Every 4 (Four) Hours As Needed for Mild Pain .   • [DISCONTINUED] propranolol (INDERAL) 40 MG tablet Take 1 tablet by mouth 3 (Three) Times a Day.   • [DISCONTINUED] Saccharomyces boulardii 500 MG pack Take 500 mg by mouth Daily.     No current facility-administered medications on file prior to visit.      Result Review :                 Assessment and Plan    Diagnoses and all orders for this visit:    1. Anxiety (Primary)  -     citalopram (CeleXA) 20 MG tablet; Take 1 tablet by mouth Daily.  Dispense: 30 tablet; Refill: 2    2. Chronic migraine without aura without status migrainosus, not intractable  -     CT Head Without Contrast; Future  -     CBC Auto Differential; Future  -     Comprehensive Metabolic Panel; Future  -     CBC Auto Differential  -     Comprehensive Metabolic Panel    3. Encounter for hepatitis C screening test for low risk patient  -     Hepatitis Panel, Acute; Future  -     Hepatitis Panel, Acute    4. Need for diphtheria, tetanus, acellular pertussis and haemophilus influenzae vaccine  -     Discontinue: tetanus-diphtheria toxoids (TENIVAC 5-2) injection 0.5 mL    5. Need for influenza vaccination  -     Influenza Vac Split Quad 0.5 ML suspension injection; Inject 0.5 mL into the appropriate muscle as directed by prescriber 1 (One) Time for 1 dose.  Dispense: 0.5 mL; Refill:  0        Follow Up   Return in about 2 months (around 12/5/2021), or labs today.  Patient was given instructions and counseling regarding his condition or for health maintenance advice. Please see specific information pulled into the AVS if appropriate.     Asif Medina  reports that he has been smoking cigarettes. He has a 10.00 pack-year smoking history. He has never used smokeless tobacco.. I have educated him on the risk of diseases from using tobacco products such as cancer, COPD and heart disease.

## 2021-10-06 ENCOUNTER — TELEPHONE (OUTPATIENT)
Dept: FAMILY MEDICINE CLINIC | Facility: CLINIC | Age: 31
End: 2021-10-06

## 2021-10-06 LAB
ALBUMIN SERPL-MCNC: 5.3 G/DL (ref 3.5–5.2)
ALBUMIN/GLOB SERPL: 2.4 G/DL
ALP SERPL-CCNC: 54 U/L (ref 39–117)
ALT SERPL W P-5'-P-CCNC: 19 U/L (ref 1–41)
ANION GAP SERPL CALCULATED.3IONS-SCNC: 10.3 MMOL/L (ref 5–15)
AST SERPL-CCNC: 22 U/L (ref 1–40)
BASOPHILS # BLD AUTO: 0.08 10*3/MM3 (ref 0–0.2)
BASOPHILS NFR BLD AUTO: 0.9 % (ref 0–1.5)
BILIRUB SERPL-MCNC: 0.5 MG/DL (ref 0–1.2)
BUN SERPL-MCNC: 12 MG/DL (ref 6–20)
BUN/CREAT SERPL: 11.9 (ref 7–25)
CALCIUM SPEC-SCNC: 10 MG/DL (ref 8.6–10.5)
CHLORIDE SERPL-SCNC: 102 MMOL/L (ref 98–107)
CO2 SERPL-SCNC: 26.7 MMOL/L (ref 22–29)
CREAT SERPL-MCNC: 1.01 MG/DL (ref 0.76–1.27)
DEPRECATED RDW RBC AUTO: 42.3 FL (ref 37–54)
EOSINOPHIL # BLD AUTO: 0.11 10*3/MM3 (ref 0–0.4)
EOSINOPHIL NFR BLD AUTO: 1.2 % (ref 0.3–6.2)
ERYTHROCYTE [DISTWIDTH] IN BLOOD BY AUTOMATED COUNT: 12.3 % (ref 12.3–15.4)
GFR SERPL CREATININE-BSD FRML MDRD: 86 ML/MIN/1.73
GLOBULIN UR ELPH-MCNC: 2.2 GM/DL
GLUCOSE SERPL-MCNC: 90 MG/DL (ref 65–99)
HAV IGM SERPL QL IA: NORMAL
HBV CORE IGM SERPL QL IA: NORMAL
HBV SURFACE AG SERPL QL IA: NORMAL
HCT VFR BLD AUTO: 47.5 % (ref 37.5–51)
HCV AB SER DONR QL: NORMAL
HGB BLD-MCNC: 16.3 G/DL (ref 13–17.7)
IMM GRANULOCYTES # BLD AUTO: 0.04 10*3/MM3 (ref 0–0.05)
IMM GRANULOCYTES NFR BLD AUTO: 0.4 % (ref 0–0.5)
LYMPHOCYTES # BLD AUTO: 1.74 10*3/MM3 (ref 0.7–3.1)
LYMPHOCYTES NFR BLD AUTO: 18.6 % (ref 19.6–45.3)
MCH RBC QN AUTO: 31.9 PG (ref 26.6–33)
MCHC RBC AUTO-ENTMCNC: 34.3 G/DL (ref 31.5–35.7)
MCV RBC AUTO: 93 FL (ref 79–97)
MONOCYTES # BLD AUTO: 0.76 10*3/MM3 (ref 0.1–0.9)
MONOCYTES NFR BLD AUTO: 8.1 % (ref 5–12)
NEUTROPHILS NFR BLD AUTO: 6.63 10*3/MM3 (ref 1.7–7)
NEUTROPHILS NFR BLD AUTO: 70.8 % (ref 42.7–76)
NRBC BLD AUTO-RTO: 0 /100 WBC (ref 0–0.2)
PLATELET # BLD AUTO: 182 10*3/MM3 (ref 140–450)
PMV BLD AUTO: 11.7 FL (ref 6–12)
POTASSIUM SERPL-SCNC: 4.8 MMOL/L (ref 3.5–5.2)
PROT SERPL-MCNC: 7.5 G/DL (ref 6–8.5)
RBC # BLD AUTO: 5.11 10*6/MM3 (ref 4.14–5.8)
SODIUM SERPL-SCNC: 139 MMOL/L (ref 136–145)
WBC # BLD AUTO: 9.36 10*3/MM3 (ref 3.4–10.8)

## 2021-10-06 NOTE — TELEPHONE ENCOUNTER
----- Message from DESTINY Daly sent at 10/6/2021  9:51 AM EDT -----  Please send a letter letting the patient know labs are normal. Thank you        Patient notified.

## 2021-10-13 ENCOUNTER — TELEPHONE (OUTPATIENT)
Dept: FAMILY MEDICINE CLINIC | Facility: CLINIC | Age: 31
End: 2021-10-13

## 2021-10-13 RX ORDER — PAROXETINE HYDROCHLORIDE 20 MG/1
20 TABLET, FILM COATED ORAL EVERY MORNING
Qty: 30 TABLET | Refills: 2 | Status: SHIPPED | OUTPATIENT
Start: 2021-10-13

## 2021-10-13 NOTE — TELEPHONE ENCOUNTER
----- Message from DESTINY Daly sent at 10/13/2021 12:55 PM EDT -----  Regarding: FW: Prescription Question  Contact: [phone]  I will switch to paxil, take it once daily.   ----- Message -----  From: Lauren Kee LPN  Sent: 10/11/2021   8:40 AM EDT  To: DESTINY Daly  Subject: FW: Prescription Question                          ----- Message -----  From: Asif Medina  Sent: 10/10/2021   5:09 AM EDT  To[facility]  Subject: Prescription Question                            The celexa made me get more and more nauseous as the day went on to the point of vomiting and diarrhea. It also made me mentally feel very wrong and confused. I don’t think I can take anymore of them.      I will switch to paxil, take it once daily.      Patient notified &verbalized understanding.

## 2021-10-26 ENCOUNTER — APPOINTMENT (OUTPATIENT)
Dept: CT IMAGING | Facility: HOSPITAL | Age: 31
End: 2021-10-26

## (undated) DEVICE — GLV SURG SENSICARE MICRO PF LF 7.5 STRL

## (undated) DEVICE — DRAPE,UTILTY,TAPE,15X26, 4EA/PK: Brand: MEDLINE

## (undated) DEVICE — PK BASIC 70

## (undated) DEVICE — DRSNG PAD ABD 8X10IN STRL

## (undated) DEVICE — PAD GRND REM POLYHESIVE A/ DISP

## (undated) DEVICE — Device: Brand: PKIT, SLEEVE, PAD COVER

## (undated) DEVICE — TRY SKINPREP PVP SCRB W PAINT

## (undated) DEVICE — HOLDER: Brand: DEROYAL

## (undated) DEVICE — GLV SURG PREMIERPRO MIC LTX PF SZ8 BRN

## (undated) DEVICE — PACK,LITHOTOMY,PK III,SIRUS: Brand: MEDLINE